# Patient Record
Sex: MALE | Race: WHITE | Employment: OTHER | URBAN - METROPOLITAN AREA
[De-identification: names, ages, dates, MRNs, and addresses within clinical notes are randomized per-mention and may not be internally consistent; named-entity substitution may affect disease eponyms.]

---

## 2017-06-17 ENCOUNTER — HOSPITAL ENCOUNTER (INPATIENT)
Age: 57
LOS: 8 days | Discharge: HOME | DRG: 885 | End: 2017-06-25
Attending: PSYCHIATRY & NEUROLOGY | Admitting: PSYCHIATRY & NEUROLOGY
Payer: MEDICARE

## 2017-06-17 DIAGNOSIS — F32.A DEPRESSION, UNSPECIFIED DEPRESSION TYPE: ICD-10-CM

## 2017-06-17 DIAGNOSIS — F31.9 BIPOLAR 1 DISORDER (HCC): Primary | ICD-10-CM

## 2017-06-17 LAB
ALBUMIN SERPL-MCNC: 4.2 G/DL (ref 3.9–4.9)
ALP BLD-CCNC: 68 U/L (ref 35–104)
ALT SERPL-CCNC: 25 U/L (ref 0–41)
AMPHETAMINE SCREEN, URINE: NORMAL
ANION GAP SERPL CALCULATED.3IONS-SCNC: 12 MEQ/L (ref 7–13)
AST SERPL-CCNC: 26 U/L (ref 0–40)
BARBITURATE SCREEN URINE: NORMAL
BASOPHILS ABSOLUTE: 0 K/UL (ref 0–0.2)
BASOPHILS RELATIVE PERCENT: 0.7 %
BENZODIAZEPINE SCREEN, URINE: NORMAL
BILIRUB SERPL-MCNC: 0.6 MG/DL (ref 0–1.2)
BILIRUBIN URINE: NEGATIVE
BLOOD, URINE: NEGATIVE
BUN BLDV-MCNC: 9 MG/DL (ref 6–20)
CALCIUM SERPL-MCNC: 9.2 MG/DL (ref 8.6–10.2)
CANNABINOID SCREEN URINE: NORMAL
CHLORIDE BLD-SCNC: 101 MEQ/L (ref 98–107)
CLARITY: CLEAR
CO2: 25 MEQ/L (ref 22–29)
COCAINE METABOLITE SCREEN URINE: NORMAL
COLOR: YELLOW
CREAT SERPL-MCNC: 0.86 MG/DL (ref 0.7–1.2)
EOSINOPHILS ABSOLUTE: 0.1 K/UL (ref 0–0.7)
EOSINOPHILS RELATIVE PERCENT: 1 %
ETHANOL PERCENT: NORMAL G/DL
ETHANOL: <10 MG/DL (ref 0–0.08)
GFR AFRICAN AMERICAN: >60
GFR NON-AFRICAN AMERICAN: >60
GLOBULIN: 2.6 G/DL (ref 2.3–3.5)
GLUCOSE BLD-MCNC: 133 MG/DL (ref 74–109)
GLUCOSE URINE: NEGATIVE MG/DL
HCT VFR BLD CALC: 47 % (ref 42–52)
HEMOGLOBIN: 16.2 G/DL (ref 14–18)
KETONES, URINE: NEGATIVE MG/DL
LEUKOCYTE ESTERASE, URINE: NEGATIVE
LYMPHOCYTES ABSOLUTE: 2.4 K/UL (ref 1–4.8)
LYMPHOCYTES RELATIVE PERCENT: 32.4 %
Lab: NORMAL
MCH RBC QN AUTO: 34.7 PG (ref 27–31.3)
MCHC RBC AUTO-ENTMCNC: 34.5 % (ref 33–37)
MCV RBC AUTO: 100.7 FL (ref 80–100)
MONOCYTES ABSOLUTE: 0.5 K/UL (ref 0.2–0.8)
MONOCYTES RELATIVE PERCENT: 6.8 %
NEUTROPHILS ABSOLUTE: 4.3 K/UL (ref 1.4–6.5)
NEUTROPHILS RELATIVE PERCENT: 59.1 %
NITRITE, URINE: NEGATIVE
OPIATE SCREEN URINE: NORMAL
PDW BLD-RTO: 14.7 % (ref 11.5–14.5)
PH UA: 6.5 (ref 5–9)
PHENCYCLIDINE SCREEN URINE: NORMAL
PLATELET # BLD: 151 K/UL (ref 130–400)
POTASSIUM SERPL-SCNC: 3.8 MEQ/L (ref 3.5–5.1)
PROTEIN UA: NEGATIVE MG/DL
RBC # BLD: 4.66 M/UL (ref 4.7–6.1)
SODIUM BLD-SCNC: 138 MEQ/L (ref 132–144)
SPECIFIC GRAVITY UA: 1 (ref 1–1.03)
TOTAL CK: 76 U/L (ref 0–190)
TOTAL PROTEIN: 6.8 G/DL (ref 6.4–8.1)
TSH SERPL DL<=0.05 MIU/L-ACNC: 0.44 UIU/ML (ref 0.27–4.2)
UROBILINOGEN, URINE: 0.2 E.U./DL
WBC # BLD: 7.3 K/UL (ref 4.8–10.8)

## 2017-06-17 PROCEDURE — 81003 URINALYSIS AUTO W/O SCOPE: CPT

## 2017-06-17 PROCEDURE — 82550 ASSAY OF CK (CPK): CPT

## 2017-06-17 PROCEDURE — 6370000000 HC RX 637 (ALT 250 FOR IP): Performed by: PSYCHIATRY & NEUROLOGY

## 2017-06-17 PROCEDURE — 1240000000 HC EMOTIONAL WELLNESS R&B

## 2017-06-17 PROCEDURE — 80053 COMPREHEN METABOLIC PANEL: CPT

## 2017-06-17 PROCEDURE — 80307 DRUG TEST PRSMV CHEM ANLYZR: CPT

## 2017-06-17 PROCEDURE — 84443 ASSAY THYROID STIM HORMONE: CPT

## 2017-06-17 PROCEDURE — 6370000000 HC RX 637 (ALT 250 FOR IP): Performed by: PHYSICIAN ASSISTANT

## 2017-06-17 PROCEDURE — 36415 COLL VENOUS BLD VENIPUNCTURE: CPT

## 2017-06-17 PROCEDURE — 99285 EMERGENCY DEPT VISIT HI MDM: CPT

## 2017-06-17 PROCEDURE — 85025 COMPLETE CBC W/AUTO DIFF WBC: CPT

## 2017-06-17 PROCEDURE — 80074 ACUTE HEPATITIS PANEL: CPT

## 2017-06-17 PROCEDURE — G0480 DRUG TEST DEF 1-7 CLASSES: HCPCS

## 2017-06-17 RX ORDER — ALPRAZOLAM 0.5 MG/1
1 TABLET ORAL EVERY 12 HOURS PRN
Status: DISCONTINUED | OUTPATIENT
Start: 2017-06-17 | End: 2017-06-25 | Stop reason: HOSPADM

## 2017-06-17 RX ORDER — ACETAMINOPHEN 325 MG/1
650 TABLET ORAL EVERY 4 HOURS PRN
Status: DISCONTINUED | OUTPATIENT
Start: 2017-06-17 | End: 2017-06-25 | Stop reason: HOSPADM

## 2017-06-17 RX ORDER — METOPROLOL SUCCINATE 25 MG/1
25 TABLET, EXTENDED RELEASE ORAL DAILY
Status: DISCONTINUED | OUTPATIENT
Start: 2017-06-17 | End: 2017-06-25 | Stop reason: HOSPADM

## 2017-06-17 RX ORDER — LORAZEPAM 1 MG/1
1 TABLET ORAL ONCE
Status: COMPLETED | OUTPATIENT
Start: 2017-06-17 | End: 2017-06-17

## 2017-06-17 RX ORDER — NICOTINE 21 MG/24HR
1 PATCH, TRANSDERMAL 24 HOURS TRANSDERMAL DAILY
Status: DISCONTINUED | OUTPATIENT
Start: 2017-06-17 | End: 2017-06-25 | Stop reason: HOSPADM

## 2017-06-17 RX ORDER — ALPRAZOLAM 1 MG/1
1 TABLET ORAL EVERY 12 HOURS PRN
COMMUNITY

## 2017-06-17 RX ORDER — ASPIRIN 81 MG/1
81 TABLET ORAL DAILY
COMMUNITY
End: 2022-03-21

## 2017-06-17 RX ORDER — CLOPIDOGREL BISULFATE 75 MG/1
75 TABLET ORAL DAILY
Status: DISCONTINUED | OUTPATIENT
Start: 2017-06-17 | End: 2017-06-25 | Stop reason: HOSPADM

## 2017-06-17 RX ORDER — ARIPIPRAZOLE 5 MG/1
2.5 TABLET ORAL 2 TIMES DAILY
COMMUNITY
End: 2022-03-21

## 2017-06-17 RX ORDER — OXCARBAZEPINE 150 MG/1
150 TABLET, FILM COATED ORAL 2 TIMES DAILY
Status: DISCONTINUED | OUTPATIENT
Start: 2017-06-17 | End: 2017-06-17 | Stop reason: SDUPTHER

## 2017-06-17 RX ORDER — ARIPIPRAZOLE 5 MG/1
2.5 TABLET ORAL 2 TIMES DAILY
Status: DISCONTINUED | OUTPATIENT
Start: 2017-06-17 | End: 2017-06-25 | Stop reason: HOSPADM

## 2017-06-17 RX ORDER — OXCARBAZEPINE 150 MG/1
150 TABLET, FILM COATED ORAL 3 TIMES DAILY
Status: DISCONTINUED | OUTPATIENT
Start: 2017-06-17 | End: 2017-06-25 | Stop reason: HOSPADM

## 2017-06-17 RX ORDER — CLOPIDOGREL BISULFATE 75 MG/1
75 TABLET ORAL DAILY
COMMUNITY
End: 2022-06-13 | Stop reason: ALTCHOICE

## 2017-06-17 RX ORDER — HYDROXYZINE HYDROCHLORIDE 50 MG/ML
50 INJECTION, SOLUTION INTRAMUSCULAR EVERY 6 HOURS PRN
Status: DISCONTINUED | OUTPATIENT
Start: 2017-06-17 | End: 2017-06-25 | Stop reason: HOSPADM

## 2017-06-17 RX ORDER — OXCARBAZEPINE 150 MG/1
150 TABLET, FILM COATED ORAL 3 TIMES DAILY
COMMUNITY
End: 2022-03-21

## 2017-06-17 RX ORDER — METOPROLOL SUCCINATE 25 MG/1
25 TABLET, EXTENDED RELEASE ORAL DAILY
COMMUNITY
End: 2018-06-18 | Stop reason: ALTCHOICE

## 2017-06-17 RX ORDER — ASPIRIN 81 MG/1
81 TABLET ORAL DAILY
Status: DISCONTINUED | OUTPATIENT
Start: 2017-06-17 | End: 2017-06-25 | Stop reason: HOSPADM

## 2017-06-17 RX ORDER — HYDROXYZINE PAMOATE 50 MG/1
50 CAPSULE ORAL EVERY 6 HOURS PRN
Status: DISCONTINUED | OUTPATIENT
Start: 2017-06-17 | End: 2017-06-25 | Stop reason: HOSPADM

## 2017-06-17 RX ADMIN — ALPRAZOLAM 1 MG: 0.5 TABLET ORAL at 17:55

## 2017-06-17 RX ADMIN — ARIPIPRAZOLE 2.5 MG: 5 TABLET ORAL at 21:02

## 2017-06-17 RX ADMIN — OXCARBAZEPINE 150 MG: 150 TABLET ORAL at 14:30

## 2017-06-17 RX ADMIN — LORAZEPAM 1 MG: 1 TABLET ORAL at 14:30

## 2017-06-17 RX ADMIN — OXCARBAZEPINE 150 MG: 150 TABLET, FILM COATED ORAL at 21:02

## 2017-06-17 ASSESSMENT — ENCOUNTER SYMPTOMS
COUGH: 0
SORE THROAT: 0
STRIDOR: 0
WHEEZING: 0
NAUSEA: 0
DIARRHEA: 0
EYE DISCHARGE: 0
CHOKING: 0
RHINORRHEA: 0
BACK PAIN: 0
ABDOMINAL DISTENTION: 0
SHORTNESS OF BREATH: 0
COLOR CHANGE: 0
CONSTIPATION: 0
ABDOMINAL PAIN: 0
VOMITING: 0

## 2017-06-17 ASSESSMENT — SLEEP AND FATIGUE QUESTIONNAIRES
AVERAGE NUMBER OF SLEEP HOURS: 10
DO YOU USE A SLEEP AID: NO
DO YOU HAVE DIFFICULTY SLEEPING: NO

## 2017-06-17 ASSESSMENT — PAIN DESCRIPTION - FREQUENCY: FREQUENCY: INTERMITTENT

## 2017-06-17 ASSESSMENT — PATIENT HEALTH QUESTIONNAIRE - PHQ9: SUM OF ALL RESPONSES TO PHQ QUESTIONS 1-9: 26

## 2017-06-17 ASSESSMENT — PAIN SCALES - GENERAL: PAINLEVEL_OUTOF10: 7

## 2017-06-17 ASSESSMENT — PAIN DESCRIPTION - DESCRIPTORS: DESCRIPTORS: DISCOMFORT

## 2017-06-18 ENCOUNTER — APPOINTMENT (OUTPATIENT)
Dept: GENERAL RADIOLOGY | Age: 57
DRG: 885 | End: 2017-06-18
Payer: MEDICARE

## 2017-06-18 PROBLEM — I73.9 PVD (PERIPHERAL VASCULAR DISEASE) (HCC): Status: ACTIVE | Noted: 2017-06-18

## 2017-06-18 PROBLEM — I25.2 HISTORY OF MI (MYOCARDIAL INFARCTION): Status: ACTIVE | Noted: 2017-06-18

## 2017-06-18 PROBLEM — Z86.73 HISTORY OF STROKE: Status: ACTIVE | Noted: 2017-06-18

## 2017-06-18 PROBLEM — C43.9 MELANOMA (HCC): Status: ACTIVE | Noted: 2017-06-18

## 2017-06-18 PROBLEM — C34.90 LUNG CANCER (HCC): Status: ACTIVE | Noted: 2017-06-18

## 2017-06-18 PROBLEM — J44.9 COPD (CHRONIC OBSTRUCTIVE PULMONARY DISEASE) (HCC): Status: ACTIVE | Noted: 2017-06-18

## 2017-06-18 PROBLEM — B19.20 HCV (HEPATITIS C VIRUS): Status: ACTIVE | Noted: 2017-06-18

## 2017-06-18 PROBLEM — I25.10 CAD (CORONARY ARTERY DISEASE): Status: ACTIVE | Noted: 2017-06-18

## 2017-06-18 LAB
HAV IGM SER IA-ACNC: ABNORMAL
HEPATITIS B CORE IGM ANTIBODY: ABNORMAL
HEPATITIS B SURFACE ANTIGEN INTERPRETATION: ABNORMAL
HEPATITIS C ANTIBODY INTERPRETATION: REACTIVE
HEPATITIS INTERPRETATION:: ABNORMAL

## 2017-06-18 PROCEDURE — 71010 XR CHEST PORTABLE: CPT

## 2017-06-18 PROCEDURE — 6370000000 HC RX 637 (ALT 250 FOR IP): Performed by: PHYSICIAN ASSISTANT

## 2017-06-18 PROCEDURE — 94664 DEMO&/EVAL PT USE INHALER: CPT

## 2017-06-18 PROCEDURE — 6370000000 HC RX 637 (ALT 250 FOR IP): Performed by: PSYCHIATRY & NEUROLOGY

## 2017-06-18 PROCEDURE — 94640 AIRWAY INHALATION TREATMENT: CPT

## 2017-06-18 PROCEDURE — 1240000000 HC EMOTIONAL WELLNESS R&B

## 2017-06-18 PROCEDURE — 2580000003 HC RX 258: Performed by: PHYSICIAN ASSISTANT

## 2017-06-18 RX ORDER — PREDNISONE 10 MG/1
10 TABLET ORAL 2 TIMES DAILY
Status: COMPLETED | OUTPATIENT
Start: 2017-06-22 | End: 2017-06-23

## 2017-06-18 RX ORDER — 0.9 % SODIUM CHLORIDE 0.9 %
1000 INTRAVENOUS SOLUTION INTRAVENOUS ONCE
Status: COMPLETED | OUTPATIENT
Start: 2017-06-18 | End: 2017-06-18

## 2017-06-18 RX ORDER — PREDNISONE 20 MG/1
40 TABLET ORAL 2 TIMES DAILY
Status: COMPLETED | OUTPATIENT
Start: 2017-06-18 | End: 2017-06-19

## 2017-06-18 RX ORDER — AZITHROMYCIN 500 MG/1
500 TABLET, FILM COATED ORAL DAILY
Status: COMPLETED | OUTPATIENT
Start: 2017-06-18 | End: 2017-06-22

## 2017-06-18 RX ORDER — PREDNISONE 20 MG/1
20 TABLET ORAL 2 TIMES DAILY
Status: COMPLETED | OUTPATIENT
Start: 2017-06-20 | End: 2017-06-21

## 2017-06-18 RX ORDER — TRAMADOL HYDROCHLORIDE 50 MG/1
50 TABLET ORAL EVERY 6 HOURS PRN
Status: DISCONTINUED | OUTPATIENT
Start: 2017-06-18 | End: 2017-06-19

## 2017-06-18 RX ORDER — IPRATROPIUM BROMIDE AND ALBUTEROL SULFATE 2.5; .5 MG/3ML; MG/3ML
1 SOLUTION RESPIRATORY (INHALATION)
Status: DISCONTINUED | OUTPATIENT
Start: 2017-06-18 | End: 2017-06-18

## 2017-06-18 RX ORDER — SERTRALINE HYDROCHLORIDE 25 MG/1
25 TABLET, FILM COATED ORAL DAILY
Status: DISCONTINUED | OUTPATIENT
Start: 2017-06-18 | End: 2017-06-20

## 2017-06-18 RX ORDER — PREDNISONE 10 MG/1
10 TABLET ORAL DAILY
Status: DISCONTINUED | OUTPATIENT
Start: 2017-06-24 | End: 2017-06-25 | Stop reason: HOSPADM

## 2017-06-18 RX ORDER — ALBUTEROL SULFATE 90 UG/1
2 AEROSOL, METERED RESPIRATORY (INHALATION)
Status: DISCONTINUED | OUTPATIENT
Start: 2017-06-18 | End: 2017-06-25 | Stop reason: HOSPADM

## 2017-06-18 RX ADMIN — CLOPIDOGREL BISULFATE 75 MG: 75 TABLET ORAL at 09:10

## 2017-06-18 RX ADMIN — ALPRAZOLAM 1 MG: 0.5 TABLET ORAL at 09:52

## 2017-06-18 RX ADMIN — ARIPIPRAZOLE 2.5 MG: 5 TABLET ORAL at 20:12

## 2017-06-18 RX ADMIN — ASPIRIN 81 MG: 81 TABLET, COATED ORAL at 09:10

## 2017-06-18 RX ADMIN — AZITHROMYCIN 500 MG: 500 TABLET, FILM COATED ORAL at 16:58

## 2017-06-18 RX ADMIN — IPRATROPIUM BROMIDE AND ALBUTEROL 1 PUFF: 20; 100 SPRAY, METERED RESPIRATORY (INHALATION) at 19:49

## 2017-06-18 RX ADMIN — HYDROXYZINE PAMOATE 50 MG: 50 CAPSULE ORAL at 17:03

## 2017-06-18 RX ADMIN — Medication 2 PUFF: at 19:51

## 2017-06-18 RX ADMIN — SERTRALINE HYDROCHLORIDE 25 MG: 25 TABLET ORAL at 09:09

## 2017-06-18 RX ADMIN — OXCARBAZEPINE 150 MG: 150 TABLET, FILM COATED ORAL at 13:45

## 2017-06-18 RX ADMIN — ACETAMINOPHEN 650 MG: 325 TABLET ORAL at 13:56

## 2017-06-18 RX ADMIN — OXCARBAZEPINE 150 MG: 150 TABLET, FILM COATED ORAL at 20:12

## 2017-06-18 RX ADMIN — PREDNISONE 40 MG: 20 TABLET ORAL at 16:58

## 2017-06-18 RX ADMIN — METOPROLOL SUCCINATE 25 MG: 25 TABLET, FILM COATED, EXTENDED RELEASE ORAL at 09:09

## 2017-06-18 RX ADMIN — OXCARBAZEPINE 150 MG: 150 TABLET, FILM COATED ORAL at 09:10

## 2017-06-18 RX ADMIN — ARIPIPRAZOLE 2.5 MG: 5 TABLET ORAL at 09:10

## 2017-06-18 RX ADMIN — TRAMADOL HYDROCHLORIDE 50 MG: 50 TABLET, FILM COATED ORAL at 16:58

## 2017-06-18 RX ADMIN — PREDNISONE 40 MG: 20 TABLET ORAL at 20:12

## 2017-06-18 RX ADMIN — SODIUM CHLORIDE 1000 ML: 9 INJECTION, SOLUTION INTRAVENOUS at 21:38

## 2017-06-18 ASSESSMENT — PAIN SCALES - GENERAL
PAINLEVEL_OUTOF10: 4
PAINLEVEL_OUTOF10: 8
PAINLEVEL_OUTOF10: 6

## 2017-06-18 ASSESSMENT — ENCOUNTER SYMPTOMS: BACK PAIN: 1

## 2017-06-18 ASSESSMENT — LIFESTYLE VARIABLES: HISTORY_ALCOHOL_USE: YES

## 2017-06-19 ENCOUNTER — APPOINTMENT (OUTPATIENT)
Dept: GENERAL RADIOLOGY | Age: 57
DRG: 885 | End: 2017-06-19
Payer: MEDICARE

## 2017-06-19 PROBLEM — G89.4 CHRONIC PAIN DISORDER: Status: ACTIVE | Noted: 2017-06-19

## 2017-06-19 PROBLEM — F33.2 SEVERE EPISODE OF RECURRENT MAJOR DEPRESSIVE DISORDER, WITHOUT PSYCHOTIC FEATURES (HCC): Status: ACTIVE | Noted: 2017-06-19

## 2017-06-19 PROBLEM — M54.9 BACK PAIN: Chronic | Status: ACTIVE | Noted: 2017-06-19

## 2017-06-19 PROCEDURE — 72110 X-RAY EXAM L-2 SPINE 4/>VWS: CPT

## 2017-06-19 PROCEDURE — 6370000000 HC RX 637 (ALT 250 FOR IP): Performed by: PHYSICIAN ASSISTANT

## 2017-06-19 PROCEDURE — 94640 AIRWAY INHALATION TREATMENT: CPT

## 2017-06-19 PROCEDURE — 6370000000 HC RX 637 (ALT 250 FOR IP): Performed by: ANESTHESIOLOGY

## 2017-06-19 PROCEDURE — 6370000000 HC RX 637 (ALT 250 FOR IP): Performed by: PSYCHIATRY & NEUROLOGY

## 2017-06-19 PROCEDURE — 73522 X-RAY EXAM HIPS BI 3-4 VIEWS: CPT

## 2017-06-19 PROCEDURE — 99232 SBSQ HOSP IP/OBS MODERATE 35: CPT | Performed by: PSYCHIATRY & NEUROLOGY

## 2017-06-19 PROCEDURE — 1240000000 HC EMOTIONAL WELLNESS R&B

## 2017-06-19 PROCEDURE — 2580000003 HC RX 258: Performed by: INTERNAL MEDICINE

## 2017-06-19 RX ORDER — MELOXICAM 7.5 MG/1
7.5 TABLET ORAL DAILY
Status: DISCONTINUED | OUTPATIENT
Start: 2017-06-19 | End: 2017-06-24

## 2017-06-19 RX ORDER — TIZANIDINE 4 MG/1
4 TABLET ORAL 3 TIMES DAILY
Status: DISCONTINUED | OUTPATIENT
Start: 2017-06-19 | End: 2017-06-25 | Stop reason: HOSPADM

## 2017-06-19 RX ORDER — 0.9 % SODIUM CHLORIDE 0.9 %
500 INTRAVENOUS SOLUTION INTRAVENOUS ONCE
Status: COMPLETED | OUTPATIENT
Start: 2017-06-19 | End: 2017-06-19

## 2017-06-19 RX ORDER — TRAMADOL HYDROCHLORIDE 50 MG/1
50 TABLET ORAL EVERY 6 HOURS PRN
Status: DISCONTINUED | OUTPATIENT
Start: 2017-06-19 | End: 2017-06-25 | Stop reason: HOSPADM

## 2017-06-19 RX ORDER — TRAMADOL HYDROCHLORIDE 50 MG/1
50 TABLET ORAL
Status: DISCONTINUED | OUTPATIENT
Start: 2017-06-19 | End: 2017-06-19

## 2017-06-19 RX ORDER — LIDOCAINE 50 MG/G
3 PATCH TOPICAL DAILY
Status: DISCONTINUED | OUTPATIENT
Start: 2017-06-19 | End: 2017-06-25 | Stop reason: HOSPADM

## 2017-06-19 RX ADMIN — Medication 2 PUFF: at 07:53

## 2017-06-19 RX ADMIN — MELOXICAM 7.5 MG: 7.5 TABLET ORAL at 13:32

## 2017-06-19 RX ADMIN — TIZANIDINE 4 MG: 4 TABLET ORAL at 13:32

## 2017-06-19 RX ADMIN — OXCARBAZEPINE 150 MG: 150 TABLET, FILM COATED ORAL at 20:44

## 2017-06-19 RX ADMIN — SODIUM CHLORIDE 500 ML: 9 INJECTION, SOLUTION INTRAVENOUS at 14:54

## 2017-06-19 RX ADMIN — SERTRALINE HYDROCHLORIDE 25 MG: 25 TABLET ORAL at 09:54

## 2017-06-19 RX ADMIN — ASPIRIN 81 MG: 81 TABLET, COATED ORAL at 09:56

## 2017-06-19 RX ADMIN — PREDNISONE 40 MG: 20 TABLET ORAL at 09:55

## 2017-06-19 RX ADMIN — HYDROXYZINE PAMOATE 50 MG: 50 CAPSULE ORAL at 20:44

## 2017-06-19 RX ADMIN — Medication 2 PUFF: at 19:22

## 2017-06-19 RX ADMIN — AZITHROMYCIN 500 MG: 500 TABLET, FILM COATED ORAL at 09:55

## 2017-06-19 RX ADMIN — TRAMADOL HYDROCHLORIDE 50 MG: 50 TABLET, FILM COATED ORAL at 13:41

## 2017-06-19 RX ADMIN — IPRATROPIUM BROMIDE AND ALBUTEROL 1 PUFF: 20; 100 SPRAY, METERED RESPIRATORY (INHALATION) at 07:53

## 2017-06-19 RX ADMIN — OXCARBAZEPINE 150 MG: 150 TABLET, FILM COATED ORAL at 09:55

## 2017-06-19 RX ADMIN — OXCARBAZEPINE 150 MG: 150 TABLET, FILM COATED ORAL at 13:31

## 2017-06-19 RX ADMIN — TIZANIDINE 4 MG: 4 TABLET ORAL at 20:44

## 2017-06-19 RX ADMIN — IPRATROPIUM BROMIDE AND ALBUTEROL 1 PUFF: 20; 100 SPRAY, METERED RESPIRATORY (INHALATION) at 15:50

## 2017-06-19 RX ADMIN — ARIPIPRAZOLE 2.5 MG: 5 TABLET ORAL at 09:59

## 2017-06-19 RX ADMIN — ARIPIPRAZOLE 2.5 MG: 5 TABLET ORAL at 20:44

## 2017-06-19 RX ADMIN — IPRATROPIUM BROMIDE AND ALBUTEROL 1 PUFF: 20; 100 SPRAY, METERED RESPIRATORY (INHALATION) at 19:22

## 2017-06-19 RX ADMIN — ALPRAZOLAM 1 MG: 0.5 TABLET ORAL at 09:56

## 2017-06-19 RX ADMIN — CLOPIDOGREL BISULFATE 75 MG: 75 TABLET ORAL at 09:57

## 2017-06-19 RX ADMIN — PREDNISONE 40 MG: 20 TABLET ORAL at 20:44

## 2017-06-19 RX ADMIN — TRAMADOL HYDROCHLORIDE 50 MG: 50 TABLET, FILM COATED ORAL at 20:44

## 2017-06-19 RX ADMIN — TRAMADOL HYDROCHLORIDE 50 MG: 50 TABLET, FILM COATED ORAL at 09:55

## 2017-06-19 ASSESSMENT — PAIN SCALES - GENERAL
PAINLEVEL_OUTOF10: 8
PAINLEVEL_OUTOF10: 6
PAINLEVEL_OUTOF10: 7
PAINLEVEL_OUTOF10: 9

## 2017-06-20 ENCOUNTER — APPOINTMENT (OUTPATIENT)
Dept: CT IMAGING | Age: 57
DRG: 885 | End: 2017-06-20
Payer: MEDICARE

## 2017-06-20 PROCEDURE — 6370000000 HC RX 637 (ALT 250 FOR IP): Performed by: ANESTHESIOLOGY

## 2017-06-20 PROCEDURE — 6370000000 HC RX 637 (ALT 250 FOR IP): Performed by: PHYSICIAN ASSISTANT

## 2017-06-20 PROCEDURE — 1240000000 HC EMOTIONAL WELLNESS R&B

## 2017-06-20 PROCEDURE — 6370000000 HC RX 637 (ALT 250 FOR IP): Performed by: PSYCHIATRY & NEUROLOGY

## 2017-06-20 PROCEDURE — 94640 AIRWAY INHALATION TREATMENT: CPT

## 2017-06-20 PROCEDURE — 99232 SBSQ HOSP IP/OBS MODERATE 35: CPT | Performed by: PSYCHIATRY & NEUROLOGY

## 2017-06-20 PROCEDURE — 2500000003 HC RX 250 WO HCPCS: Performed by: RADIOLOGY

## 2017-06-20 PROCEDURE — 6360000004 HC RX CONTRAST MEDICATION: Performed by: RADIOLOGY

## 2017-06-20 PROCEDURE — 74160 CT ABDOMEN W/CONTRAST: CPT

## 2017-06-20 PROCEDURE — 94664 DEMO&/EVAL PT USE INHALER: CPT

## 2017-06-20 PROCEDURE — 99223 1ST HOSP IP/OBS HIGH 75: CPT | Performed by: INTERNAL MEDICINE

## 2017-06-20 RX ORDER — PANTOPRAZOLE SODIUM 40 MG/1
40 TABLET, DELAYED RELEASE ORAL
Status: DISCONTINUED | OUTPATIENT
Start: 2017-06-21 | End: 2017-06-25 | Stop reason: HOSPADM

## 2017-06-20 RX ADMIN — TIZANIDINE 4 MG: 4 TABLET ORAL at 21:10

## 2017-06-20 RX ADMIN — METOPROLOL SUCCINATE 25 MG: 25 TABLET, FILM COATED, EXTENDED RELEASE ORAL at 08:11

## 2017-06-20 RX ADMIN — TRAMADOL HYDROCHLORIDE 50 MG: 50 TABLET, FILM COATED ORAL at 21:11

## 2017-06-20 RX ADMIN — TIZANIDINE 4 MG: 4 TABLET ORAL at 14:02

## 2017-06-20 RX ADMIN — IOPAMIDOL 100 ML: 755 INJECTION, SOLUTION INTRAVENOUS at 15:50

## 2017-06-20 RX ADMIN — PREDNISONE 20 MG: 20 TABLET ORAL at 21:10

## 2017-06-20 RX ADMIN — AZITHROMYCIN 500 MG: 500 TABLET, FILM COATED ORAL at 08:13

## 2017-06-20 RX ADMIN — IPRATROPIUM BROMIDE AND ALBUTEROL 1 PUFF: 20; 100 SPRAY, METERED RESPIRATORY (INHALATION) at 06:48

## 2017-06-20 RX ADMIN — MELOXICAM 7.5 MG: 7.5 TABLET ORAL at 08:11

## 2017-06-20 RX ADMIN — ASPIRIN 81 MG: 81 TABLET, COATED ORAL at 08:10

## 2017-06-20 RX ADMIN — ALPRAZOLAM 1 MG: 0.5 TABLET ORAL at 08:12

## 2017-06-20 RX ADMIN — OXCARBAZEPINE 150 MG: 150 TABLET, FILM COATED ORAL at 14:02

## 2017-06-20 RX ADMIN — IPRATROPIUM BROMIDE AND ALBUTEROL 1 PUFF: 20; 100 SPRAY, METERED RESPIRATORY (INHALATION) at 13:02

## 2017-06-20 RX ADMIN — Medication 2 PUFF: at 06:48

## 2017-06-20 RX ADMIN — ALPRAZOLAM 1 MG: 0.5 TABLET ORAL at 21:10

## 2017-06-20 RX ADMIN — Medication 2 PUFF: at 19:54

## 2017-06-20 RX ADMIN — SERTRALINE HYDROCHLORIDE 25 MG: 25 TABLET ORAL at 08:12

## 2017-06-20 RX ADMIN — TRAMADOL HYDROCHLORIDE 50 MG: 50 TABLET, FILM COATED ORAL at 13:27

## 2017-06-20 RX ADMIN — OXCARBAZEPINE 150 MG: 150 TABLET, FILM COATED ORAL at 08:11

## 2017-06-20 RX ADMIN — PREDNISONE 20 MG: 20 TABLET ORAL at 08:12

## 2017-06-20 RX ADMIN — TRAMADOL HYDROCHLORIDE 50 MG: 50 TABLET, FILM COATED ORAL at 07:33

## 2017-06-20 RX ADMIN — ARIPIPRAZOLE 2.5 MG: 5 TABLET ORAL at 08:12

## 2017-06-20 RX ADMIN — BARIUM SULFATE 450 ML: 21 SUSPENSION ORAL at 15:51

## 2017-06-20 RX ADMIN — TIZANIDINE 4 MG: 4 TABLET ORAL at 08:13

## 2017-06-20 RX ADMIN — CLOPIDOGREL BISULFATE 75 MG: 75 TABLET ORAL at 08:11

## 2017-06-20 RX ADMIN — HYDROXYZINE PAMOATE 50 MG: 50 CAPSULE ORAL at 13:06

## 2017-06-20 ASSESSMENT — PAIN DESCRIPTION - FREQUENCY: FREQUENCY: CONTINUOUS

## 2017-06-20 ASSESSMENT — PAIN SCALES - GENERAL
PAINLEVEL_OUTOF10: 5
PAINLEVEL_OUTOF10: 10
PAINLEVEL_OUTOF10: 8
PAINLEVEL_OUTOF10: 10

## 2017-06-20 ASSESSMENT — PAIN DESCRIPTION - LOCATION: LOCATION: FLANK

## 2017-06-20 ASSESSMENT — PAIN DESCRIPTION - DESCRIPTORS: DESCRIPTORS: ACHING

## 2017-06-21 PROCEDURE — 86480 TB TEST CELL IMMUN MEASURE: CPT

## 2017-06-21 PROCEDURE — 6370000000 HC RX 637 (ALT 250 FOR IP): Performed by: ANESTHESIOLOGY

## 2017-06-21 PROCEDURE — 6370000000 HC RX 637 (ALT 250 FOR IP): Performed by: PSYCHIATRY & NEUROLOGY

## 2017-06-21 PROCEDURE — 6370000000 HC RX 637 (ALT 250 FOR IP): Performed by: PHYSICIAN ASSISTANT

## 2017-06-21 PROCEDURE — 99232 SBSQ HOSP IP/OBS MODERATE 35: CPT | Performed by: PSYCHIATRY & NEUROLOGY

## 2017-06-21 PROCEDURE — 6370000000 HC RX 637 (ALT 250 FOR IP): Performed by: INTERNAL MEDICINE

## 2017-06-21 PROCEDURE — 94640 AIRWAY INHALATION TREATMENT: CPT

## 2017-06-21 PROCEDURE — 94760 N-INVAS EAR/PLS OXIMETRY 1: CPT

## 2017-06-21 PROCEDURE — 1240000000 HC EMOTIONAL WELLNESS R&B

## 2017-06-21 RX ADMIN — CLOPIDOGREL BISULFATE 75 MG: 75 TABLET ORAL at 08:55

## 2017-06-21 RX ADMIN — ARIPIPRAZOLE 2.5 MG: 5 TABLET ORAL at 20:15

## 2017-06-21 RX ADMIN — PREDNISONE 20 MG: 20 TABLET ORAL at 08:54

## 2017-06-21 RX ADMIN — AZITHROMYCIN 500 MG: 500 TABLET, FILM COATED ORAL at 08:55

## 2017-06-21 RX ADMIN — ASPIRIN 81 MG: 81 TABLET, COATED ORAL at 08:56

## 2017-06-21 RX ADMIN — OXCARBAZEPINE 150 MG: 150 TABLET, FILM COATED ORAL at 14:01

## 2017-06-21 RX ADMIN — PREDNISONE 20 MG: 20 TABLET ORAL at 20:16

## 2017-06-21 RX ADMIN — MELOXICAM 7.5 MG: 7.5 TABLET ORAL at 08:56

## 2017-06-21 RX ADMIN — PANTOPRAZOLE SODIUM 40 MG: 40 TABLET, DELAYED RELEASE ORAL at 06:42

## 2017-06-21 RX ADMIN — ARIPIPRAZOLE 2.5 MG: 5 TABLET ORAL at 08:54

## 2017-06-21 RX ADMIN — TIZANIDINE 4 MG: 4 TABLET ORAL at 20:15

## 2017-06-21 RX ADMIN — IPRATROPIUM BROMIDE AND ALBUTEROL 1 PUFF: 20; 100 SPRAY, METERED RESPIRATORY (INHALATION) at 07:18

## 2017-06-21 RX ADMIN — SERTRALINE HYDROCHLORIDE 50 MG: 50 TABLET ORAL at 08:55

## 2017-06-21 RX ADMIN — ALPRAZOLAM 1 MG: 0.5 TABLET ORAL at 21:03

## 2017-06-21 RX ADMIN — IPRATROPIUM BROMIDE AND ALBUTEROL 1 PUFF: 20; 100 SPRAY, METERED RESPIRATORY (INHALATION) at 23:10

## 2017-06-21 RX ADMIN — TRAMADOL HYDROCHLORIDE 50 MG: 50 TABLET, FILM COATED ORAL at 20:15

## 2017-06-21 RX ADMIN — TRAMADOL HYDROCHLORIDE 50 MG: 50 TABLET, FILM COATED ORAL at 13:03

## 2017-06-21 RX ADMIN — TIZANIDINE 4 MG: 4 TABLET ORAL at 14:01

## 2017-06-21 RX ADMIN — METOPROLOL SUCCINATE 25 MG: 25 TABLET, FILM COATED, EXTENDED RELEASE ORAL at 08:55

## 2017-06-21 RX ADMIN — Medication 2 PUFF: at 07:18

## 2017-06-21 RX ADMIN — Medication 2 PUFF: at 23:11

## 2017-06-21 RX ADMIN — TIZANIDINE 4 MG: 4 TABLET ORAL at 08:55

## 2017-06-21 RX ADMIN — OXCARBAZEPINE 150 MG: 150 TABLET, FILM COATED ORAL at 08:54

## 2017-06-21 RX ADMIN — ALPRAZOLAM 1 MG: 0.5 TABLET ORAL at 08:55

## 2017-06-21 RX ADMIN — TRAMADOL HYDROCHLORIDE 50 MG: 50 TABLET, FILM COATED ORAL at 06:42

## 2017-06-21 RX ADMIN — OXCARBAZEPINE 150 MG: 150 TABLET, FILM COATED ORAL at 20:15

## 2017-06-21 ASSESSMENT — PAIN SCALES - GENERAL
PAINLEVEL_OUTOF10: 10

## 2017-06-22 PROCEDURE — 6370000000 HC RX 637 (ALT 250 FOR IP): Performed by: PSYCHIATRY & NEUROLOGY

## 2017-06-22 PROCEDURE — 99232 SBSQ HOSP IP/OBS MODERATE 35: CPT | Performed by: PSYCHIATRY & NEUROLOGY

## 2017-06-22 PROCEDURE — 6370000000 HC RX 637 (ALT 250 FOR IP): Performed by: PHYSICIAN ASSISTANT

## 2017-06-22 PROCEDURE — 6370000000 HC RX 637 (ALT 250 FOR IP): Performed by: INTERNAL MEDICINE

## 2017-06-22 PROCEDURE — 6370000000 HC RX 637 (ALT 250 FOR IP): Performed by: ANESTHESIOLOGY

## 2017-06-22 PROCEDURE — 94640 AIRWAY INHALATION TREATMENT: CPT

## 2017-06-22 PROCEDURE — 94760 N-INVAS EAR/PLS OXIMETRY 1: CPT

## 2017-06-22 PROCEDURE — 1240000000 HC EMOTIONAL WELLNESS R&B

## 2017-06-22 RX ADMIN — TIZANIDINE 4 MG: 4 TABLET ORAL at 14:58

## 2017-06-22 RX ADMIN — TRAMADOL HYDROCHLORIDE 50 MG: 50 TABLET, FILM COATED ORAL at 09:59

## 2017-06-22 RX ADMIN — TRAMADOL HYDROCHLORIDE 50 MG: 50 TABLET, FILM COATED ORAL at 20:22

## 2017-06-22 RX ADMIN — ALPRAZOLAM 1 MG: 0.5 TABLET ORAL at 09:58

## 2017-06-22 RX ADMIN — IPRATROPIUM BROMIDE AND ALBUTEROL 1 PUFF: 20; 100 SPRAY, METERED RESPIRATORY (INHALATION) at 07:14

## 2017-06-22 RX ADMIN — ARIPIPRAZOLE 2.5 MG: 5 TABLET ORAL at 20:21

## 2017-06-22 RX ADMIN — CLOPIDOGREL BISULFATE 75 MG: 75 TABLET ORAL at 09:59

## 2017-06-22 RX ADMIN — PANTOPRAZOLE SODIUM 40 MG: 40 TABLET, DELAYED RELEASE ORAL at 07:05

## 2017-06-22 RX ADMIN — ARIPIPRAZOLE 2.5 MG: 5 TABLET ORAL at 10:00

## 2017-06-22 RX ADMIN — OXCARBAZEPINE 150 MG: 150 TABLET, FILM COATED ORAL at 10:01

## 2017-06-22 RX ADMIN — PREDNISONE 10 MG: 10 TABLET ORAL at 20:22

## 2017-06-22 RX ADMIN — PREDNISONE 10 MG: 10 TABLET ORAL at 10:05

## 2017-06-22 RX ADMIN — SERTRALINE HYDROCHLORIDE 50 MG: 50 TABLET ORAL at 09:59

## 2017-06-22 RX ADMIN — ASPIRIN 81 MG: 81 TABLET, COATED ORAL at 09:59

## 2017-06-22 RX ADMIN — ALPRAZOLAM 1 MG: 0.5 TABLET ORAL at 23:10

## 2017-06-22 RX ADMIN — AZITHROMYCIN 500 MG: 500 TABLET, FILM COATED ORAL at 10:02

## 2017-06-22 RX ADMIN — TIZANIDINE 4 MG: 4 TABLET ORAL at 09:59

## 2017-06-22 RX ADMIN — Medication 2 PUFF: at 07:14

## 2017-06-22 RX ADMIN — Medication 2 PUFF: at 20:12

## 2017-06-22 RX ADMIN — MELOXICAM 7.5 MG: 7.5 TABLET ORAL at 09:59

## 2017-06-22 RX ADMIN — OXCARBAZEPINE 150 MG: 150 TABLET, FILM COATED ORAL at 20:21

## 2017-06-22 RX ADMIN — HYDROXYZINE PAMOATE 50 MG: 50 CAPSULE ORAL at 20:22

## 2017-06-22 RX ADMIN — OXCARBAZEPINE 150 MG: 150 TABLET, FILM COATED ORAL at 14:57

## 2017-06-22 RX ADMIN — TIZANIDINE 4 MG: 4 TABLET ORAL at 20:22

## 2017-06-22 ASSESSMENT — PAIN SCALES - GENERAL
PAINLEVEL_OUTOF10: 6
PAINLEVEL_OUTOF10: 8

## 2017-06-23 PROCEDURE — 99231 SBSQ HOSP IP/OBS SF/LOW 25: CPT | Performed by: PSYCHIATRY & NEUROLOGY

## 2017-06-23 PROCEDURE — 6370000000 HC RX 637 (ALT 250 FOR IP): Performed by: PHYSICIAN ASSISTANT

## 2017-06-23 PROCEDURE — 6370000000 HC RX 637 (ALT 250 FOR IP): Performed by: PSYCHIATRY & NEUROLOGY

## 2017-06-23 PROCEDURE — 6370000000 HC RX 637 (ALT 250 FOR IP): Performed by: INTERNAL MEDICINE

## 2017-06-23 PROCEDURE — 1240000000 HC EMOTIONAL WELLNESS R&B

## 2017-06-23 PROCEDURE — 94640 AIRWAY INHALATION TREATMENT: CPT

## 2017-06-23 PROCEDURE — 6370000000 HC RX 637 (ALT 250 FOR IP): Performed by: ANESTHESIOLOGY

## 2017-06-23 PROCEDURE — 90833 PSYTX W PT W E/M 30 MIN: CPT | Performed by: PSYCHIATRY & NEUROLOGY

## 2017-06-23 RX ADMIN — PREDNISONE 10 MG: 10 TABLET ORAL at 20:19

## 2017-06-23 RX ADMIN — ASPIRIN 81 MG: 81 TABLET, COATED ORAL at 08:45

## 2017-06-23 RX ADMIN — TIZANIDINE 4 MG: 4 TABLET ORAL at 08:44

## 2017-06-23 RX ADMIN — HYDROXYZINE PAMOATE 50 MG: 50 CAPSULE ORAL at 20:18

## 2017-06-23 RX ADMIN — SERTRALINE HYDROCHLORIDE 50 MG: 50 TABLET ORAL at 08:44

## 2017-06-23 RX ADMIN — ARIPIPRAZOLE 2.5 MG: 5 TABLET ORAL at 20:19

## 2017-06-23 RX ADMIN — OXCARBAZEPINE 150 MG: 150 TABLET, FILM COATED ORAL at 20:19

## 2017-06-23 RX ADMIN — OXCARBAZEPINE 150 MG: 150 TABLET, FILM COATED ORAL at 08:44

## 2017-06-23 RX ADMIN — MELOXICAM 7.5 MG: 7.5 TABLET ORAL at 08:44

## 2017-06-23 RX ADMIN — ALPRAZOLAM 1 MG: 0.5 TABLET ORAL at 11:17

## 2017-06-23 RX ADMIN — PREDNISONE 10 MG: 10 TABLET ORAL at 08:47

## 2017-06-23 RX ADMIN — TRAMADOL HYDROCHLORIDE 50 MG: 50 TABLET, FILM COATED ORAL at 09:23

## 2017-06-23 RX ADMIN — OXCARBAZEPINE 150 MG: 150 TABLET, FILM COATED ORAL at 14:11

## 2017-06-23 RX ADMIN — TIZANIDINE 4 MG: 4 TABLET ORAL at 20:19

## 2017-06-23 RX ADMIN — TRAMADOL HYDROCHLORIDE 50 MG: 50 TABLET, FILM COATED ORAL at 20:19

## 2017-06-23 RX ADMIN — IPRATROPIUM BROMIDE AND ALBUTEROL 1 PUFF: 20; 100 SPRAY, METERED RESPIRATORY (INHALATION) at 07:46

## 2017-06-23 RX ADMIN — TIZANIDINE 4 MG: 4 TABLET ORAL at 14:11

## 2017-06-23 RX ADMIN — CLOPIDOGREL BISULFATE 75 MG: 75 TABLET ORAL at 08:44

## 2017-06-23 RX ADMIN — Medication 2 PUFF: at 07:45

## 2017-06-23 RX ADMIN — ARIPIPRAZOLE 2.5 MG: 5 TABLET ORAL at 08:45

## 2017-06-23 RX ADMIN — PANTOPRAZOLE SODIUM 40 MG: 40 TABLET, DELAYED RELEASE ORAL at 06:19

## 2017-06-23 ASSESSMENT — PAIN SCALES - GENERAL
PAINLEVEL_OUTOF10: 5
PAINLEVEL_OUTOF10: 8

## 2017-06-24 PROBLEM — M13.859: Chronic | Status: ACTIVE | Noted: 2017-06-24

## 2017-06-24 PROBLEM — G89.4 CHRONIC PAIN SYNDROME: Status: ACTIVE | Noted: 2017-06-24

## 2017-06-24 PROBLEM — M47.816 LUMBAR SPONDYLOSIS: Status: ACTIVE | Noted: 2017-06-24

## 2017-06-24 LAB
QUANTIFERON (R) TB GOLD (INCUBATED): NEGATIVE
QUANTIFERON MITOGEN: >10 IU/ML
QUANTIFERON NIL: 0.04 IU/ML
QUANTIFERON TB AG MINUS NIL: 0.05 IU/ML (ref 0–0.34)

## 2017-06-24 PROCEDURE — 6370000000 HC RX 637 (ALT 250 FOR IP): Performed by: PSYCHIATRY & NEUROLOGY

## 2017-06-24 PROCEDURE — 6370000000 HC RX 637 (ALT 250 FOR IP): Performed by: PHYSICIAN ASSISTANT

## 2017-06-24 PROCEDURE — 6370000000 HC RX 637 (ALT 250 FOR IP): Performed by: INTERNAL MEDICINE

## 2017-06-24 PROCEDURE — 94640 AIRWAY INHALATION TREATMENT: CPT

## 2017-06-24 PROCEDURE — 1240000000 HC EMOTIONAL WELLNESS R&B

## 2017-06-24 PROCEDURE — 6370000000 HC RX 637 (ALT 250 FOR IP): Performed by: ANESTHESIOLOGY

## 2017-06-24 RX ORDER — LIDOCAINE 50 MG/G
3 PATCH TOPICAL DAILY
Qty: 90 PATCH | Refills: 2 | Status: SHIPPED | OUTPATIENT
Start: 2017-06-24 | End: 2018-06-18 | Stop reason: ALTCHOICE

## 2017-06-24 RX ORDER — ACETAMINOPHEN 325 MG/1
650 TABLET ORAL EVERY 4 HOURS PRN
Qty: 120 TABLET | Refills: 1 | Status: SHIPPED | OUTPATIENT
Start: 2017-06-24 | End: 2018-06-18 | Stop reason: ALTCHOICE

## 2017-06-24 RX ORDER — TRAMADOL HYDROCHLORIDE 50 MG/1
50 TABLET ORAL EVERY 6 HOURS PRN
Qty: 28 TABLET | Refills: 0 | Status: SHIPPED | OUTPATIENT
Start: 2017-06-24 | End: 2017-07-01

## 2017-06-24 RX ORDER — TIZANIDINE 4 MG/1
4 TABLET ORAL EVERY 6 HOURS PRN
Qty: 45 TABLET | Refills: 1 | Status: SHIPPED | OUTPATIENT
Start: 2017-06-24 | End: 2018-06-18 | Stop reason: ALTCHOICE

## 2017-06-24 RX ORDER — MELOXICAM 7.5 MG/1
7.5 TABLET ORAL DAILY
Qty: 60 TABLET | Refills: 2 | Status: SHIPPED | OUTPATIENT
Start: 2017-06-24 | End: 2018-06-18 | Stop reason: ALTCHOICE

## 2017-06-24 RX ADMIN — OXCARBAZEPINE 150 MG: 150 TABLET, FILM COATED ORAL at 10:02

## 2017-06-24 RX ADMIN — TRAMADOL HYDROCHLORIDE 50 MG: 50 TABLET, FILM COATED ORAL at 21:02

## 2017-06-24 RX ADMIN — ALPRAZOLAM 1 MG: 0.5 TABLET ORAL at 21:02

## 2017-06-24 RX ADMIN — ARIPIPRAZOLE 2.5 MG: 5 TABLET ORAL at 21:01

## 2017-06-24 RX ADMIN — PREDNISONE 10 MG: 10 TABLET ORAL at 10:00

## 2017-06-24 RX ADMIN — OXCARBAZEPINE 150 MG: 150 TABLET, FILM COATED ORAL at 13:58

## 2017-06-24 RX ADMIN — MELOXICAM 7.5 MG: 7.5 TABLET ORAL at 10:00

## 2017-06-24 RX ADMIN — TIZANIDINE 4 MG: 4 TABLET ORAL at 21:02

## 2017-06-24 RX ADMIN — ARIPIPRAZOLE 2.5 MG: 5 TABLET ORAL at 09:59

## 2017-06-24 RX ADMIN — ASPIRIN 81 MG: 81 TABLET, COATED ORAL at 10:00

## 2017-06-24 RX ADMIN — CLOPIDOGREL BISULFATE 75 MG: 75 TABLET ORAL at 10:00

## 2017-06-24 RX ADMIN — Medication 2 PUFF: at 19:46

## 2017-06-24 RX ADMIN — TIZANIDINE 4 MG: 4 TABLET ORAL at 10:00

## 2017-06-24 RX ADMIN — ALPRAZOLAM 1 MG: 0.5 TABLET ORAL at 10:09

## 2017-06-24 RX ADMIN — OXCARBAZEPINE 150 MG: 150 TABLET, FILM COATED ORAL at 21:01

## 2017-06-24 RX ADMIN — PANTOPRAZOLE SODIUM 40 MG: 40 TABLET, DELAYED RELEASE ORAL at 06:33

## 2017-06-24 RX ADMIN — Medication 2 PUFF: at 08:28

## 2017-06-24 RX ADMIN — TRAMADOL HYDROCHLORIDE 50 MG: 50 TABLET, FILM COATED ORAL at 13:58

## 2017-06-24 RX ADMIN — SERTRALINE HYDROCHLORIDE 50 MG: 50 TABLET ORAL at 10:00

## 2017-06-24 RX ADMIN — TIZANIDINE 4 MG: 4 TABLET ORAL at 13:58

## 2017-06-24 ASSESSMENT — PAIN SCALES - GENERAL
PAINLEVEL_OUTOF10: 8
PAINLEVEL_OUTOF10: 6
PAINLEVEL_OUTOF10: 8
PAINLEVEL_OUTOF10: 8

## 2017-06-25 VITALS
RESPIRATION RATE: 20 BRPM | WEIGHT: 130 LBS | TEMPERATURE: 98 F | HEART RATE: 84 BPM | SYSTOLIC BLOOD PRESSURE: 93 MMHG | HEIGHT: 69 IN | BODY MASS INDEX: 19.26 KG/M2 | OXYGEN SATURATION: 98 % | DIASTOLIC BLOOD PRESSURE: 65 MMHG

## 2017-06-25 PROCEDURE — 94760 N-INVAS EAR/PLS OXIMETRY 1: CPT

## 2017-06-25 PROCEDURE — 6370000000 HC RX 637 (ALT 250 FOR IP): Performed by: ANESTHESIOLOGY

## 2017-06-25 PROCEDURE — 6370000000 HC RX 637 (ALT 250 FOR IP): Performed by: PSYCHIATRY & NEUROLOGY

## 2017-06-25 PROCEDURE — 6370000000 HC RX 637 (ALT 250 FOR IP): Performed by: PHYSICIAN ASSISTANT

## 2017-06-25 PROCEDURE — 94640 AIRWAY INHALATION TREATMENT: CPT

## 2017-06-25 PROCEDURE — 6370000000 HC RX 637 (ALT 250 FOR IP): Performed by: INTERNAL MEDICINE

## 2017-06-25 RX ORDER — ALBUTEROL SULFATE 90 UG/1
2 AEROSOL, METERED RESPIRATORY (INHALATION) EVERY 6 HOURS PRN
Qty: 1 INHALER | Refills: 0 | Status: SHIPPED | OUTPATIENT
Start: 2017-06-25 | End: 2022-04-14 | Stop reason: ALTCHOICE

## 2017-06-25 RX ORDER — PREDNISONE 10 MG/1
10 TABLET ORAL DAILY
Qty: 3 TABLET | Refills: 0 | Status: SHIPPED | OUTPATIENT
Start: 2017-06-24 | End: 2017-06-28

## 2017-06-25 RX ORDER — PANTOPRAZOLE SODIUM 40 MG/1
40 TABLET, DELAYED RELEASE ORAL
Qty: 30 TABLET | Refills: 0 | Status: SHIPPED | OUTPATIENT
Start: 2017-06-25 | End: 2018-06-18 | Stop reason: ALTCHOICE

## 2017-06-25 RX ADMIN — CLOPIDOGREL BISULFATE 75 MG: 75 TABLET ORAL at 10:32

## 2017-06-25 RX ADMIN — ALPRAZOLAM 1 MG: 0.5 TABLET ORAL at 10:32

## 2017-06-25 RX ADMIN — ARIPIPRAZOLE 2.5 MG: 5 TABLET ORAL at 10:33

## 2017-06-25 RX ADMIN — ASPIRIN 81 MG: 81 TABLET, COATED ORAL at 10:32

## 2017-06-25 RX ADMIN — PREDNISONE 10 MG: 10 TABLET ORAL at 10:32

## 2017-06-25 RX ADMIN — TIZANIDINE 4 MG: 4 TABLET ORAL at 10:31

## 2017-06-25 RX ADMIN — Medication 2 PUFF: at 09:11

## 2017-06-25 RX ADMIN — PANTOPRAZOLE SODIUM 40 MG: 40 TABLET, DELAYED RELEASE ORAL at 06:43

## 2017-06-25 RX ADMIN — SERTRALINE HYDROCHLORIDE 50 MG: 50 TABLET ORAL at 10:33

## 2017-06-25 RX ADMIN — OXCARBAZEPINE 150 MG: 150 TABLET, FILM COATED ORAL at 10:35

## 2017-06-25 RX ADMIN — TRAMADOL HYDROCHLORIDE 50 MG: 50 TABLET, FILM COATED ORAL at 10:32

## 2017-06-25 RX ADMIN — HYDROXYZINE PAMOATE 50 MG: 50 CAPSULE ORAL at 03:15

## 2017-06-25 RX ADMIN — TRAMADOL HYDROCHLORIDE 50 MG: 50 TABLET, FILM COATED ORAL at 03:15

## 2017-06-25 ASSESSMENT — PAIN SCALES - GENERAL
PAINLEVEL_OUTOF10: 8
PAINLEVEL_OUTOF10: 8
PAINLEVEL_OUTOF10: 6

## 2018-06-18 ENCOUNTER — OFFICE VISIT (OUTPATIENT)
Dept: PULMONOLOGY | Age: 58
End: 2018-06-18
Payer: MEDICARE

## 2018-06-18 VITALS
BODY MASS INDEX: 19.55 KG/M2 | DIASTOLIC BLOOD PRESSURE: 70 MMHG | HEART RATE: 79 BPM | HEIGHT: 69 IN | SYSTOLIC BLOOD PRESSURE: 100 MMHG | TEMPERATURE: 97.1 F | RESPIRATION RATE: 16 BRPM | WEIGHT: 132 LBS | OXYGEN SATURATION: 97 %

## 2018-06-18 DIAGNOSIS — R91.8 LUNG MASS: Primary | ICD-10-CM

## 2018-06-18 PROCEDURE — 99204 OFFICE O/P NEW MOD 45 MIN: CPT | Performed by: INTERNAL MEDICINE

## 2018-06-18 PROCEDURE — G8598 ASA/ANTIPLAT THER USED: HCPCS | Performed by: INTERNAL MEDICINE

## 2018-06-18 PROCEDURE — 3017F COLORECTAL CA SCREEN DOC REV: CPT | Performed by: INTERNAL MEDICINE

## 2018-06-18 PROCEDURE — G8420 CALC BMI NORM PARAMETERS: HCPCS | Performed by: INTERNAL MEDICINE

## 2018-06-18 PROCEDURE — G8427 DOCREV CUR MEDS BY ELIG CLIN: HCPCS | Performed by: INTERNAL MEDICINE

## 2018-06-18 PROCEDURE — 4004F PT TOBACCO SCREEN RCVD TLK: CPT | Performed by: INTERNAL MEDICINE

## 2018-06-18 RX ORDER — DILTIAZEM HYDROCHLORIDE 120 MG/1
CAPSULE, EXTENDED RELEASE ORAL
Refills: 11 | COMMUNITY
Start: 2018-06-02 | End: 2022-03-21

## 2018-06-18 RX ORDER — ATORVASTATIN CALCIUM 20 MG/1
TABLET, FILM COATED ORAL
Refills: 3 | COMMUNITY
Start: 2018-06-02

## 2018-06-18 ASSESSMENT — ENCOUNTER SYMPTOMS
SHORTNESS OF BREATH: 0
WHEEZING: 0
CHEST TIGHTNESS: 0
VOMITING: 0
DIARRHEA: 0
COUGH: 0
SORE THROAT: 0
SINUS PRESSURE: 0
NAUSEA: 0
ABDOMINAL PAIN: 0
RHINORRHEA: 0

## 2018-06-19 ENCOUNTER — TELEPHONE (OUTPATIENT)
Dept: PULMONOLOGY | Age: 58
End: 2018-06-19

## 2018-07-05 ENCOUNTER — OFFICE VISIT (OUTPATIENT)
Dept: PULMONOLOGY | Age: 58
End: 2018-07-05
Payer: MEDICARE

## 2018-07-05 VITALS
HEART RATE: 77 BPM | TEMPERATURE: 96.4 F | DIASTOLIC BLOOD PRESSURE: 70 MMHG | WEIGHT: 130.6 LBS | OXYGEN SATURATION: 96 % | BODY MASS INDEX: 19.34 KG/M2 | HEIGHT: 69 IN | SYSTOLIC BLOOD PRESSURE: 118 MMHG

## 2018-07-05 DIAGNOSIS — R91.8 LUNG MASS: Primary | ICD-10-CM

## 2018-07-05 DIAGNOSIS — J43.9 PULMONARY EMPHYSEMA, UNSPECIFIED EMPHYSEMA TYPE (HCC): ICD-10-CM

## 2018-07-05 DIAGNOSIS — Z85.118 HISTORY OF LUNG CANCER: ICD-10-CM

## 2018-07-05 PROCEDURE — 3017F COLORECTAL CA SCREEN DOC REV: CPT | Performed by: INTERNAL MEDICINE

## 2018-07-05 PROCEDURE — G8427 DOCREV CUR MEDS BY ELIG CLIN: HCPCS | Performed by: INTERNAL MEDICINE

## 2018-07-05 PROCEDURE — G8598 ASA/ANTIPLAT THER USED: HCPCS | Performed by: INTERNAL MEDICINE

## 2018-07-05 PROCEDURE — G8420 CALC BMI NORM PARAMETERS: HCPCS | Performed by: INTERNAL MEDICINE

## 2018-07-05 PROCEDURE — 4004F PT TOBACCO SCREEN RCVD TLK: CPT | Performed by: INTERNAL MEDICINE

## 2018-07-05 PROCEDURE — G8926 SPIRO NO PERF OR DOC: HCPCS | Performed by: INTERNAL MEDICINE

## 2018-07-05 PROCEDURE — 3023F SPIROM DOC REV: CPT | Performed by: INTERNAL MEDICINE

## 2018-07-05 PROCEDURE — 99214 OFFICE O/P EST MOD 30 MIN: CPT | Performed by: INTERNAL MEDICINE

## 2018-07-05 RX ORDER — IPRATROPIUM BROMIDE AND ALBUTEROL SULFATE 2.5; .5 MG/3ML; MG/3ML
SOLUTION RESPIRATORY (INHALATION)
Refills: 0 | COMMUNITY
Start: 2018-04-24 | End: 2022-03-21

## 2018-07-05 RX ORDER — DOXYCYCLINE HYCLATE 100 MG/1
CAPSULE ORAL
Refills: 0 | Status: ON HOLD | COMMUNITY
Start: 2018-05-28 | End: 2018-07-13

## 2018-07-05 RX ORDER — NITROGLYCERIN 0.4 MG/1
TABLET SUBLINGUAL
Refills: 1 | COMMUNITY
Start: 2018-04-23

## 2018-07-05 ASSESSMENT — ENCOUNTER SYMPTOMS
WHEEZING: 0
VOMITING: 0
RHINORRHEA: 0
SHORTNESS OF BREATH: 1
DIARRHEA: 0
NAUSEA: 0
COUGH: 1
SINUS PRESSURE: 0
CHEST TIGHTNESS: 0
ABDOMINAL PAIN: 0
SORE THROAT: 0

## 2018-07-05 NOTE — PROGRESS NOTES
Other Topics Concern    Not on file     Social History Narrative    No narrative on file         Review of Systems   Constitutional: Positive for fatigue. Negative for chills, diaphoresis and fever. HENT: Negative for congestion, postnasal drip, rhinorrhea, sinus pressure, sneezing and sore throat. Eyes: Positive for visual disturbance. Respiratory: Positive for cough and shortness of breath. Negative for chest tightness and wheezing. Cardiovascular: Negative for chest pain, palpitations and leg swelling. Gastrointestinal: Negative for abdominal pain, diarrhea, nausea and vomiting. Genitourinary: Negative for difficulty urinating and hematuria. Musculoskeletal: Negative for arthralgias, joint swelling and myalgias. Skin: Negative for rash. Allergic/Immunologic: Negative for environmental allergies. Neurological: Negative for dizziness, tremors, weakness and headaches. Psychiatric/Behavioral: Negative for behavioral problems and sleep disturbance. Objective:     Vitals:    07/05/18 1055   BP: 118/70   Site: Left Arm   Pulse: 77   Temp: 96.4 °F (35.8 °C)   TempSrc: Tympanic   SpO2: 96%   Weight: 130 lb 9.6 oz (59.2 kg)   Height: 5' 9\" (1.753 m)         Physical Exam   Constitutional: He is oriented to person, place, and time. He appears well-developed and well-nourished. HENT:   Head: Normocephalic and atraumatic. Mouth/Throat: Oropharynx is clear and moist.   Eyes:   Left eye inoculation   Neck: Normal range of motion. Neck supple. No JVD present. No tracheal deviation present. No thyromegaly present. Cardiovascular: Normal rate and regular rhythm. Exam reveals no gallop. No murmur heard. Pulmonary/Chest: Effort normal and breath sounds normal. He has no wheezes. He has no rales. He exhibits no tenderness. Diminished breath sounds clear otherwise   Abdominal: He exhibits no distension. Musculoskeletal: Normal range of motion. He exhibits no edema.    Neurological: He is alert and oriented to person, place, and time. He has normal reflexes. Skin: Skin is warm and dry. No rash noted. Psychiatric: He has a normal mood and affect. Assessment:      Diagnosis Orders   1. Lung mass  Bronchoscopy   2. Pulmonary emphysema, unspecified emphysema type ([facility]     3. History of lung cancer       CT findings in comparison to others were all discussed today at length. Endoscopy to rule out right hilar endobronchial lesion was discussed otherwise follow-up surveillance with CT scan PET scans was also discussed and recommended      Plan:     Orders Placed This Encounter   Procedures    Bronchoscopy     Standing Status:   Future     Standing Expiration Date:   7/13/2018     No orders of the defined types were placed in this encounter. Return in about 3 weeks (around 7/26/2018) for review tests, re-evaluation.       Kristina Santoro MD

## 2018-07-13 ENCOUNTER — HOSPITAL ENCOUNTER (OUTPATIENT)
Age: 58
Setting detail: OUTPATIENT SURGERY
Discharge: HOME | End: 2018-07-13
Attending: INTERNAL MEDICINE | Admitting: INTERNAL MEDICINE
Payer: MEDICARE

## 2018-07-13 ENCOUNTER — ANESTHESIA (OUTPATIENT)
Dept: OPERATING ROOM | Age: 58
End: 2018-07-13
Payer: MEDICARE

## 2018-07-13 ENCOUNTER — ANESTHESIA EVENT (OUTPATIENT)
Dept: OPERATING ROOM | Age: 58
End: 2018-07-13
Payer: MEDICARE

## 2018-07-13 VITALS
DIASTOLIC BLOOD PRESSURE: 70 MMHG | SYSTOLIC BLOOD PRESSURE: 116 MMHG | RESPIRATION RATE: 18 BRPM | OXYGEN SATURATION: 100 %

## 2018-07-13 VITALS
WEIGHT: 130 LBS | SYSTOLIC BLOOD PRESSURE: 116 MMHG | OXYGEN SATURATION: 96 % | HEIGHT: 69 IN | TEMPERATURE: 97.4 F | BODY MASS INDEX: 19.26 KG/M2 | DIASTOLIC BLOOD PRESSURE: 52 MMHG | HEART RATE: 72 BPM | RESPIRATION RATE: 20 BRPM

## 2018-07-13 PROCEDURE — 3700000000 HC ANESTHESIA ATTENDED CARE: Performed by: INTERNAL MEDICINE

## 2018-07-13 PROCEDURE — 3700000001 HC ADD 15 MINUTES (ANESTHESIA): Performed by: INTERNAL MEDICINE

## 2018-07-13 PROCEDURE — 3600000003 HC SURGERY LEVEL 3 BASE: Performed by: INTERNAL MEDICINE

## 2018-07-13 PROCEDURE — 7100000001 HC PACU RECOVERY - ADDTL 15 MIN: Performed by: INTERNAL MEDICINE

## 2018-07-13 PROCEDURE — 31622 DX BRONCHOSCOPE/WASH: CPT | Performed by: INTERNAL MEDICINE

## 2018-07-13 PROCEDURE — 3600000013 HC SURGERY LEVEL 3 ADDTL 15MIN: Performed by: INTERNAL MEDICINE

## 2018-07-13 PROCEDURE — 7100000011 HC PHASE II RECOVERY - ADDTL 15 MIN: Performed by: INTERNAL MEDICINE

## 2018-07-13 PROCEDURE — 6360000002 HC RX W HCPCS: Performed by: NURSE ANESTHETIST, CERTIFIED REGISTERED

## 2018-07-13 PROCEDURE — 2580000003 HC RX 258: Performed by: NURSE PRACTITIONER

## 2018-07-13 PROCEDURE — 2709999900 HC NON-CHARGEABLE SUPPLY: Performed by: INTERNAL MEDICINE

## 2018-07-13 PROCEDURE — 2500000003 HC RX 250 WO HCPCS: Performed by: NURSE PRACTITIONER

## 2018-07-13 PROCEDURE — 7100000000 HC PACU RECOVERY - FIRST 15 MIN: Performed by: INTERNAL MEDICINE

## 2018-07-13 PROCEDURE — 7100000010 HC PHASE II RECOVERY - FIRST 15 MIN: Performed by: INTERNAL MEDICINE

## 2018-07-13 RX ORDER — SODIUM CHLORIDE 0.9 % (FLUSH) 0.9 %
10 SYRINGE (ML) INJECTION EVERY 12 HOURS SCHEDULED
Status: DISCONTINUED | OUTPATIENT
Start: 2018-07-13 | End: 2018-07-13 | Stop reason: HOSPADM

## 2018-07-13 RX ORDER — METOCLOPRAMIDE HYDROCHLORIDE 5 MG/ML
10 INJECTION INTRAMUSCULAR; INTRAVENOUS
Status: DISCONTINUED | OUTPATIENT
Start: 2018-07-13 | End: 2018-07-13 | Stop reason: HOSPADM

## 2018-07-13 RX ORDER — LIDOCAINE HYDROCHLORIDE 10 MG/ML
1 INJECTION, SOLUTION EPIDURAL; INFILTRATION; INTRACAUDAL; PERINEURAL
Status: COMPLETED | OUTPATIENT
Start: 2018-07-13 | End: 2018-07-13

## 2018-07-13 RX ORDER — SODIUM CHLORIDE, SODIUM LACTATE, POTASSIUM CHLORIDE, CALCIUM CHLORIDE 600; 310; 30; 20 MG/100ML; MG/100ML; MG/100ML; MG/100ML
INJECTION, SOLUTION INTRAVENOUS CONTINUOUS
Status: DISCONTINUED | OUTPATIENT
Start: 2018-07-13 | End: 2018-07-13 | Stop reason: HOSPADM

## 2018-07-13 RX ORDER — SODIUM CHLORIDE 0.9 % (FLUSH) 0.9 %
10 SYRINGE (ML) INJECTION PRN
Status: DISCONTINUED | OUTPATIENT
Start: 2018-07-13 | End: 2018-07-13 | Stop reason: HOSPADM

## 2018-07-13 RX ORDER — ONDANSETRON 2 MG/ML
4 INJECTION INTRAMUSCULAR; INTRAVENOUS
Status: DISCONTINUED | OUTPATIENT
Start: 2018-07-13 | End: 2018-07-13 | Stop reason: HOSPADM

## 2018-07-13 RX ORDER — PROPOFOL 10 MG/ML
INJECTION, EMULSION INTRAVENOUS CONTINUOUS PRN
Status: DISCONTINUED | OUTPATIENT
Start: 2018-07-13 | End: 2018-07-13 | Stop reason: SDUPTHER

## 2018-07-13 RX ORDER — PROPOFOL 10 MG/ML
INJECTION, EMULSION INTRAVENOUS PRN
Status: DISCONTINUED | OUTPATIENT
Start: 2018-07-13 | End: 2018-07-13 | Stop reason: SDUPTHER

## 2018-07-13 RX ORDER — DIPHENHYDRAMINE HYDROCHLORIDE 50 MG/ML
12.5 INJECTION INTRAMUSCULAR; INTRAVENOUS
Status: DISCONTINUED | OUTPATIENT
Start: 2018-07-13 | End: 2018-07-13 | Stop reason: HOSPADM

## 2018-07-13 RX ORDER — SODIUM CHLORIDE 9 MG/ML
INJECTION, SOLUTION INTRAVENOUS CONTINUOUS
Status: DISCONTINUED | OUTPATIENT
Start: 2018-07-13 | End: 2018-07-13 | Stop reason: HOSPADM

## 2018-07-13 RX ORDER — HYDROCODONE BITARTRATE AND ACETAMINOPHEN 5; 325 MG/1; MG/1
1 TABLET ORAL PRN
Status: DISCONTINUED | OUTPATIENT
Start: 2018-07-13 | End: 2018-07-13 | Stop reason: HOSPADM

## 2018-07-13 RX ORDER — FENTANYL CITRATE 50 UG/ML
50 INJECTION, SOLUTION INTRAMUSCULAR; INTRAVENOUS EVERY 10 MIN PRN
Status: DISCONTINUED | OUTPATIENT
Start: 2018-07-13 | End: 2018-07-13 | Stop reason: HOSPADM

## 2018-07-13 RX ORDER — LIDOCAINE HYDROCHLORIDE 10 MG/ML
1 INJECTION, SOLUTION EPIDURAL; INFILTRATION; INTRACAUDAL; PERINEURAL
Status: DISCONTINUED | OUTPATIENT
Start: 2018-07-13 | End: 2018-07-13 | Stop reason: HOSPADM

## 2018-07-13 RX ORDER — HYDROCODONE BITARTRATE AND ACETAMINOPHEN 5; 325 MG/1; MG/1
2 TABLET ORAL PRN
Status: DISCONTINUED | OUTPATIENT
Start: 2018-07-13 | End: 2018-07-13 | Stop reason: HOSPADM

## 2018-07-13 RX ORDER — MEPERIDINE HYDROCHLORIDE 25 MG/ML
12.5 INJECTION INTRAMUSCULAR; INTRAVENOUS; SUBCUTANEOUS EVERY 5 MIN PRN
Status: DISCONTINUED | OUTPATIENT
Start: 2018-07-13 | End: 2018-07-13 | Stop reason: HOSPADM

## 2018-07-13 RX ADMIN — SODIUM CHLORIDE, POTASSIUM CHLORIDE, SODIUM LACTATE AND CALCIUM CHLORIDE: 600; 310; 30; 20 INJECTION, SOLUTION INTRAVENOUS at 11:14

## 2018-07-13 RX ADMIN — LIDOCAINE HYDROCHLORIDE 1 ML: 10 INJECTION, SOLUTION EPIDURAL; INFILTRATION; INTRACAUDAL; PERINEURAL at 11:14

## 2018-07-13 RX ADMIN — PROPOFOL 60 MG: 10 INJECTION, EMULSION INTRAVENOUS at 13:15

## 2018-07-13 RX ADMIN — PROPOFOL 100 MCG/KG/MIN: 10 INJECTION, EMULSION INTRAVENOUS at 13:15

## 2018-07-13 ASSESSMENT — PULMONARY FUNCTION TESTS
PIF_VALUE: 1
PIF_VALUE: 0
PIF_VALUE: 1
PIF_VALUE: 0
PIF_VALUE: 1
PIF_VALUE: 0
PIF_VALUE: 0
PIF_VALUE: 1

## 2018-07-13 ASSESSMENT — PAIN - FUNCTIONAL ASSESSMENT: PAIN_FUNCTIONAL_ASSESSMENT: 0-10

## 2018-07-13 ASSESSMENT — COPD QUESTIONNAIRES: CAT_SEVERITY: NO INTERVAL CHANGE

## 2018-07-13 NOTE — PROGRESS NOTES
Received in PACU from OR accompanied by REINALDO Griffith CRNA. Awake answering questions appropriately. Speech clear. Moves times four. No complaints offered.

## 2018-07-13 NOTE — ANESTHESIA POSTPROCEDURE EVALUATION
Department of Anesthesiology  Postprocedure Note    Patient: Shane Morales  MRN: [id number]  YOB: 1960  Date of evaluation: 7/13/2018  Time:  1:30 PM     Procedure Summary     Date:  07/13/18 Room / Location:  [address] Files OR    Anesthesia Start:  1311 Anesthesia Stop:  1330    Procedure:  BRONCHOSCOPY (N/A ) Diagnosis:  (LUNG MASS)    Surgeon:  Mickey Dewitt MD Responsible Provider:  Fernando Doll MD    Anesthesia Type:  MAC ASA Status:  4          Anesthesia Type: MAC    Iain Phase I:      Iain Phase II:      Last vitals: Reviewed and per EMR flowsheets.        Anesthesia Post Evaluation    Patient location during evaluation: PACU  Patient participation: complete - patient participated  Level of consciousness: awake and awake and alert  Airway patency: patent  Nausea & Vomiting: no nausea and no vomiting  Complications: no  Cardiovascular status: blood pressure returned to baseline and hemodynamically stable  Respiratory status: acceptable and room air  Hydration status: euvolemic

## 2018-07-13 NOTE — H&P
HPI  Patient was referred regarding lung mass and history of lung cancer. Since his last visit he had a follow-up CT of the chest done which continued show bullous emphysema in the upper lobes, residual fibrotic appearing nodular area in the right upper lobe in addition to hilar and mediastinal adenopathy unchanged compared to prior CTs in May and April. Patient reports mild periodic cough as well as recent increase in his shortness breath when he is out in this hot humid weather.     Allergies:  Patient has no known allergies. Past Medical History        Past Medical History:   Diagnosis Date    Arthritis      Asthma      Bipolar disorder ([address]      CAD (coronary artery disease)      Cancer (HCC)       R lung cancer, bladder cancer 6 times     Cerebral artery occlusion with cerebral infarction ([address]      COPD (chronic obstructive pulmonary disease) (HCC)      Depression      Hepatitis C      Hyperlipidemia      Kidney stone 2015    Melanoma ([address] 2011     L eye was removed     Melanoma ([facility] 2011     L eye was removed          Past Surgical History         Past Surgical History:   Procedure Laterality Date    CORONARY ANGIOPLASTY WITH STENT PLACEMENT Left       stents placed to L carotid (2015) L arm (6/2017)    EYE SURGERY         melanoma took L eye    VASCULAR SURGERY             Family History   History reviewed. No pertinent family history. Social History   Social History            Social History    Marital status: Single       Spouse name: N/A    Number of children: N/A    Years of education: N/A          Occupational History    Not on file.             Social History Main Topics    Smoking status: Current Every Day Smoker       Packs/day: 1.00       Years: 45.00       Types: Cigarettes    Smokeless tobacco: Never Used    Alcohol use No    Drug use:  No         Comment: repoerts using occassionally, last use 2 days ago    Sexual activity: Not on file           Other Topics

## 2018-07-13 NOTE — PROCEDURES
Colin Rogers is a 65 y.o. male patient. No diagnosis found. Past Medical History:   Diagnosis Date    Arthritis     Asthma     B[facility] disorder ([facility]     CAD (coronary artery disease)     Cancer ([facility]     R lung cancer, bladder cancer 6 times     Cerebral artery occlusion with cerebral infarction ([facility]     COPD (chronic obstructive pulmonary disease) ([facility]     Depression     Hepatitis C     Hyperlipidemia     Kidney stone 2015    Melanoma ([facility] [address] [facility] 2011    L eye was removed     [facility] 2011    L eye was removed      Blood pressure 107/66, pulse 61, temperature 97.4 °F (36.3 °C), temperature source Temporal, resp. rate 18, height 5' 9\" (1.753 m), weight 130 lb (59 kg), SpO2 100 %. Procedures    Indications:   History of lung cancer, Residual mass    Consent:   Obtained from patient prior to procedure after explaining indications, risks, and alternatives    Sedation:   Deep sedation applied with Diprivan    Procedure:   Patient was adequately sedated first, he had continuous oxygen monitoring as well as hemodynamic monitoring throughout the procedure. Fiberoptic bronchoscope was passed through the right nares. Vocal cords were visualized and appeared normal and moved normally. Bronchoscopy scope was then advanced into the trachea with no endotracheal lesions or abnormalities noted. Bronchoscope was then advanced into the right main bronchus and right upper, right lower, and right middle lobes were all inspected thoroughly and no endobronchial lesions or abnormalities seen. Bronchoscope was then withdrawn to the teresa level and advanced into the left main bronchus. Left upper lobe, lingula, and left lower lobe bronchi were all inspected thoroughly and no endobronchial abnormalities noted  Bronchoscope was then withdrawn out to the teresa and completely withdrawn out of the patient. Patient tolerated the procedure very well with no complications noted.     Postoperative diagnosis:   Lung

## 2018-07-13 NOTE — ANESTHESIA PRE PROCEDURE
lactated ringers infusion   Intravenous Continuous Adele [place], APRN -  mL/hr at 07/13/18 1114      sodium chloride flush 0.9 % injection 10 mL  10 mL Intravenous 2 times per day Adele [place], APRN - CNP        sodium chloride flush 0.9 % injection 10 mL  10 mL Intravenous PRN Adele [place], APRN - CNP        fentaNYL (SUBLIMAZE) injection 50 mcg  50 mcg Intravenous Q10 Min PRN Kade Ferguson MD        HYDROmorphone (DILAUDID) injection 0.5 mg  0.5 mg Intravenous Q10 Min PRN Kade Ferguson MD        HYDROcodone-acetaminophen [facility]) 5-325 MG per tablet 1 tablet  1 tablet Oral PRN Kade Ferguson MD        Or    HYDROcodone-acetaminophen [facility]) 5-325 MG per tablet 2 tablet  2 tablet Oral PRN Kade Ferguson MD        diphenhydrAMINE (BENADRYL) injection 12.5 mg  12.5 mg Intravenous Once PRN Kade Ferguson MD        ondansetron [facility]) injection 4 mg  4 mg Intravenous Once PRN Kade Ferguson MD        metoclopramide [facility]) injection 10 mg  10 mg Intravenous Once PRN Kade Ferguson MD        meperidine (DEMEROL) injection 12.5 mg  12.5 mg Intravenous Q5 Min PRN Kade Ferguson MD        0.9 % sodium chloride infusion   Intravenous Continuous Kade Ferguson MD        sodium chloride flush 0.9 % injection 10 mL  10 mL Intravenous 2 times per day Kade Ferguson MD        sodium chloride flush 0.9 % injection 10 mL  10 mL Intravenous PRN Kade Ferguson MD        lidocaine PF 1 % injection 1 mL  1 mL Intradermal Once PRN Kade Ferguson MD           Allergies:  No Known Allergies    Problem List:    Patient Active Problem List   Diagnosis Code    HCV (hepatitis C virus) B19.20    PVD (peripheral vascular disease) ([facility] I73.9    CAD (coronary artery disease) I25.10    History of MI (myocardial infarction) I25.2    History of stroke Z86.73    Melanoma ([facility] C43.9    COPD (chronic obstructive Date of last solid food consumption: 07/12/18    BMI:   Wt Readings from Last 3 Encounters:   07/13/18 130 lb (59 kg)   07/05/18 130 lb 9.6 oz (59.2 kg)   06/18/18 132 lb (59.9 kg)     Body mass index is 19.2 kg/m². CBC:   Lab Results   Component Value Date    WBC 7.3 06/17/2017    RBC 4.66 06/17/2017    HGB 16.2 06/17/2017    HCT 47.0 06/17/2017    .7 06/17/2017    RDW 14.7 06/17/2017     06/17/2017       CMP:   Lab Results   Component Value Date     06/11/2018    K 4.3 06/11/2018    CL 98 06/11/2018    CO2 27 06/11/2018    BUN 11 06/11/2018    CREATININE 1.03 06/11/2018    GFRAA >60.0 06/11/2018    LABGLOM >60.0 06/11/2018    GLUCOSE 95 06/11/2018    PROT 7.5 06/11/2018    CALCIUM 10.0 06/11/2018    BILITOT 0.7 06/11/2018    ALKPHOS 68 06/11/2018    AST 21 06/11/2018    ALT 16 06/11/2018       POC Tests: No results for input(s): POCGLU, POCNA, POCK, POCCL, POCBUN, POCHEMO, POCHCT in the last 72 hours. Coags: No results found for: PROTIME, INR, APTT    HCG (If Applicable): No results found for: PREGTESTUR, PREGSERUM, HCG, HCGQUANT     ABGs: No results found for: PHART, PO2ART, YYW3WDX, YPX1TGL, BEART, U6PAWGVV     Type & Screen (If Applicable):  No results found for: LABABO, [address]    Anesthesia Evaluation  Patient summary reviewed and Nursing notes reviewed no history of anesthetic complications:   Airway: Mallampati: II  TM distance: >3 FB   Neck ROM: full  Mouth opening: > = 3 FB Dental: normal exam         Pulmonary:normal exam    (+) COPD: no interval change,  asthma:                            Cardiovascular:    (+) CAD: no interval change,       ECG reviewed                        Neuro/Psych:   (+) CVA: no interval change,             GI/Hepatic/Renal:   (+) hepatitis: C,           Endo/Other: Negative Endo/Other ROS             Pt had PAT visit. Abdominal:           Vascular: negative vascular ROS.                                        Anesthesia Plan      MAC     ASA 4           MIPS: Prophylactic antiemetics administered. Anesthetic plan and risks discussed with patient. Plan discussed with CRNA.     Attending anesthesiologist reviewed and agrees with Pre Eval content              Sharmaine Torres MD   7/13/2018

## 2018-07-26 ENCOUNTER — OFFICE VISIT (OUTPATIENT)
Dept: PULMONOLOGY | Age: 58
End: 2018-07-26
Payer: MEDICARE

## 2018-07-26 VITALS
DIASTOLIC BLOOD PRESSURE: 70 MMHG | BODY MASS INDEX: 19.26 KG/M2 | WEIGHT: 130 LBS | OXYGEN SATURATION: 100 % | SYSTOLIC BLOOD PRESSURE: 108 MMHG | TEMPERATURE: 97.5 F | HEIGHT: 69 IN | HEART RATE: 99 BPM

## 2018-07-26 DIAGNOSIS — Z85.118 HISTORY OF LUNG CANCER: ICD-10-CM

## 2018-07-26 DIAGNOSIS — R91.8 LUNG MASS: ICD-10-CM

## 2018-07-26 DIAGNOSIS — J43.9 PULMONARY EMPHYSEMA, UNSPECIFIED EMPHYSEMA TYPE (HCC): Primary | ICD-10-CM

## 2018-07-26 PROCEDURE — G8926 SPIRO NO PERF OR DOC: HCPCS | Performed by: INTERNAL MEDICINE

## 2018-07-26 PROCEDURE — 3023F SPIROM DOC REV: CPT | Performed by: INTERNAL MEDICINE

## 2018-07-26 PROCEDURE — 3017F COLORECTAL CA SCREEN DOC REV: CPT | Performed by: INTERNAL MEDICINE

## 2018-07-26 PROCEDURE — G8420 CALC BMI NORM PARAMETERS: HCPCS | Performed by: INTERNAL MEDICINE

## 2018-07-26 PROCEDURE — 99214 OFFICE O/P EST MOD 30 MIN: CPT | Performed by: INTERNAL MEDICINE

## 2018-07-26 PROCEDURE — G8598 ASA/ANTIPLAT THER USED: HCPCS | Performed by: INTERNAL MEDICINE

## 2018-07-26 PROCEDURE — 4004F PT TOBACCO SCREEN RCVD TLK: CPT | Performed by: INTERNAL MEDICINE

## 2018-07-26 PROCEDURE — G8427 DOCREV CUR MEDS BY ELIG CLIN: HCPCS | Performed by: INTERNAL MEDICINE

## 2018-07-26 ASSESSMENT — ENCOUNTER SYMPTOMS
WHEEZING: 0
COUGH: 1
VOMITING: 0
SORE THROAT: 0
CHEST TIGHTNESS: 0
SHORTNESS OF BREATH: 1
NAUSEA: 0
ABDOMINAL PAIN: 0
SINUS PRESSURE: 0
RHINORRHEA: 0
DIARRHEA: 0

## 2018-07-26 NOTE — PROGRESS NOTES
Subjective: Tess Bower is a 65 y.o. male who complains today of:     Chief Complaint   Patient presents with    Follow-up       HPI  Patient is here for a follow-up visit regarding abnormal CT of the chest with history of lung cancer and COPD. Since previous visit he had a bronchoscopy done which showed no endobronchial lesions. Washings were obtained from the right upper lobe were abnormal density was noted and sent for cytology which came back negative for malignant cells. Patient reports no change in his symptoms is mainly complaining of fatigue, shortness breath on exertion, and mild chronic cough. Allergies:  Patient has no known allergies. Past Medical History:   Diagnosis Date    Arthritis     Asthma     Bipolar disorder ([address]     CAD (coronary artery disease)     Cancer ([facility]     R lung cancer, bladder cancer 6 times     Cerebral artery occlusion with cerebral infarction ([facility]     COPD (chronic obstructive pulmonary disease) ([facility]     Depression     Hepatitis C     Hyperlipidemia     Kidney stone 2015    Melanoma ([facility] 2011    L eye was removed     Melanoma ([facility] 2011    L eye was removed      Past Surgical History:   Procedure Laterality Date    CORONARY ANGIOPLASTY WITH STENT PLACEMENT Left     stents placed to L carotid (2015) L arm (6/2017)    EYE SURGERY      melanoma took L eye    [facility] [address] INCL FLUOR GDNCE DX W/CELL WASHG SPX N/A 7/13/2018    BRONCHOSCOPY performed by Blaze Cruz MD at [address] [place] [address]       History reviewed. No pertinent family history. Social History     Social History    Marital status: Single     Spouse name: N/A    Number of children: N/A    Years of education: N/A     Occupational History    Not on file.      Social History Main Topics    Smoking status: Current Every Day Smoker     Packs/day: 0.50     Years: 50.00     Types: Cigarettes    Smokeless tobacco: Never Used      Comment: Patient states he quit yesterday   

## 2022-01-01 ENCOUNTER — HOSPITAL ENCOUNTER (OUTPATIENT)
Dept: DATA CONVERSION | Facility: HOSPITAL | Age: 62
Discharge: HOME | End: 2022-05-11
Attending: UROLOGY | Admitting: UROLOGY

## 2022-01-01 DIAGNOSIS — R00.0 TACHYCARDIA, UNSPECIFIED: ICD-10-CM

## 2022-01-01 DIAGNOSIS — E83.42 HYPOMAGNESEMIA: ICD-10-CM

## 2022-01-01 DIAGNOSIS — Z85.118 PERSONAL HISTORY OF OTHER MALIGNANT NEOPLASM OF BRONCHUS AND LUNG: ICD-10-CM

## 2022-01-01 DIAGNOSIS — C67.2 MALIGNANT NEOPLASM OF LATERAL WALL OF BLADDER (MULTI): ICD-10-CM

## 2022-01-01 DIAGNOSIS — R55 SYNCOPE AND COLLAPSE: ICD-10-CM

## 2022-01-01 DIAGNOSIS — Z85.820 PERSONAL HISTORY OF MALIGNANT MELANOMA OF SKIN: ICD-10-CM

## 2022-01-01 DIAGNOSIS — C67.9 MALIGNANT NEOPLASM OF BLADDER, UNSPECIFIED (MULTI): ICD-10-CM

## 2022-01-01 DIAGNOSIS — I50.32 CHRONIC DIASTOLIC (CONGESTIVE) HEART FAILURE (MULTI): ICD-10-CM

## 2022-01-01 DIAGNOSIS — I25.10 ATHEROSCLEROTIC HEART DISEASE OF NATIVE CORONARY ARTERY WITHOUT ANGINA PECTORIS: ICD-10-CM

## 2022-01-01 DIAGNOSIS — I11.0 HYPERTENSIVE HEART DISEASE WITH HEART FAILURE (MULTI): ICD-10-CM

## 2022-01-01 DIAGNOSIS — Z20.822 CONTACT WITH AND (SUSPECTED) EXPOSURE TO COVID-19: ICD-10-CM

## 2022-01-01 DIAGNOSIS — R06.00 DYSPNEA, UNSPECIFIED: ICD-10-CM

## 2022-01-01 DIAGNOSIS — Z86.73 PERSONAL HISTORY OF TRANSIENT ISCHEMIC ATTACK (TIA), AND CEREBRAL INFARCTION WITHOUT RESIDUAL DEFICITS: ICD-10-CM

## 2022-01-01 DIAGNOSIS — J44.9 CHRONIC OBSTRUCTIVE PULMONARY DISEASE, UNSPECIFIED (MULTI): ICD-10-CM

## 2022-01-01 DIAGNOSIS — F17.200 NICOTINE DEPENDENCE, UNSPECIFIED, UNCOMPLICATED: ICD-10-CM

## 2022-01-01 LAB
COMPLETE PATHOLOGY REPORT: NORMAL
CONVERTED CLINICAL DIAGNOSIS-HISTORY: NORMAL
CONVERTED FINAL DIAGNOSIS: NORMAL
CONVERTED FINAL REPORT PDF LINK TO COPY AND PASTE: NORMAL
CONVERTED GROSS DESCRIPTION: NORMAL

## 2022-03-21 ENCOUNTER — OFFICE VISIT (OUTPATIENT)
Dept: PALLATIVE CARE | Age: 62
End: 2022-03-21
Payer: MEDICARE

## 2022-03-21 VITALS
TEMPERATURE: 100.9 F | RESPIRATION RATE: 16 BRPM | SYSTOLIC BLOOD PRESSURE: 132 MMHG | OXYGEN SATURATION: 97 % | WEIGHT: 135 LBS | BODY MASS INDEX: 19.94 KG/M2 | HEART RATE: 115 BPM | DIASTOLIC BLOOD PRESSURE: 72 MMHG

## 2022-03-21 DIAGNOSIS — C34.90 MALIGNANT NEOPLASM OF LUNG, UNSPECIFIED LATERALITY, UNSPECIFIED PART OF LUNG (HCC): ICD-10-CM

## 2022-03-21 DIAGNOSIS — K59.00 CONSTIPATION, UNSPECIFIED CONSTIPATION TYPE: ICD-10-CM

## 2022-03-21 DIAGNOSIS — F41.9 ANXIETY: ICD-10-CM

## 2022-03-21 DIAGNOSIS — C69.92 MALIGNANT MELANOMA OF LEFT EYE (HCC): ICD-10-CM

## 2022-03-21 DIAGNOSIS — R63.4 WEIGHT LOSS: ICD-10-CM

## 2022-03-21 DIAGNOSIS — G89.3 CANCER ASSOCIATED PAIN: Primary | ICD-10-CM

## 2022-03-21 DIAGNOSIS — F17.210 TOBACCO DEPENDENCE DUE TO CIGARETTES: ICD-10-CM

## 2022-03-21 DIAGNOSIS — C67.9 BLADDER CARCINOMA (HCC): ICD-10-CM

## 2022-03-21 DIAGNOSIS — Z51.5 PALLIATIVE CARE ENCOUNTER: ICD-10-CM

## 2022-03-21 DIAGNOSIS — K92.1 COMPLAINT OF MELENA: ICD-10-CM

## 2022-03-21 PROBLEM — E78.00 PURE HYPERCHOLESTEROLEMIA: Status: ACTIVE | Noted: 2019-03-23

## 2022-03-21 PROBLEM — I10 ESSENTIAL HYPERTENSION: Status: ACTIVE | Noted: 2019-03-23

## 2022-03-21 PROBLEM — Z90.01 ACQUIRED ABSENCE OF EYE: Status: ACTIVE | Noted: 2018-05-27

## 2022-03-21 PROBLEM — C69.90 MALIGNANT MELANOMA OF EYE (HCC): Status: ACTIVE | Noted: 2022-03-21

## 2022-03-21 PROCEDURE — 99497 ADVNCD CARE PLAN 30 MIN: CPT | Performed by: NURSE PRACTITIONER

## 2022-03-21 PROCEDURE — 4004F PT TOBACCO SCREEN RCVD TLK: CPT | Performed by: NURSE PRACTITIONER

## 2022-03-21 PROCEDURE — G8427 DOCREV CUR MEDS BY ELIG CLIN: HCPCS | Performed by: NURSE PRACTITIONER

## 2022-03-21 PROCEDURE — G8420 CALC BMI NORM PARAMETERS: HCPCS | Performed by: NURSE PRACTITIONER

## 2022-03-21 PROCEDURE — G8484 FLU IMMUNIZE NO ADMIN: HCPCS | Performed by: NURSE PRACTITIONER

## 2022-03-21 PROCEDURE — 3017F COLORECTAL CA SCREEN DOC REV: CPT | Performed by: NURSE PRACTITIONER

## 2022-03-21 PROCEDURE — 99205 OFFICE O/P NEW HI 60 MIN: CPT | Performed by: NURSE PRACTITIONER

## 2022-03-21 RX ORDER — OXYCODONE AND ACETAMINOPHEN 10; 325 MG/1; MG/1
1 TABLET ORAL EVERY 6 HOURS PRN
Qty: 120 TABLET | Refills: 0 | Status: SHIPPED | OUTPATIENT
Start: 2022-03-21 | End: 2022-04-18 | Stop reason: SDUPTHER

## 2022-03-21 RX ORDER — OXYCODONE AND ACETAMINOPHEN 10; 325 MG/1; MG/1
1 TABLET ORAL EVERY 6 HOURS PRN
COMMUNITY
End: 2022-03-21

## 2022-03-21 ASSESSMENT — ENCOUNTER SYMPTOMS
COLOR CHANGE: 0
ABDOMINAL DISTENTION: 0
COUGH: 0
VOMITING: 0
CONSTIPATION: 1
SHORTNESS OF BREATH: 0
NAUSEA: 0
BACK PAIN: 1
DIARRHEA: 0
TROUBLE SWALLOWING: 0
ABDOMINAL PAIN: 0
BLOOD IN STOOL: 1
WHEEZING: 0

## 2022-03-21 NOTE — PROGRESS NOTES
Subjective:      Patient Id: Seen Amauri Mckenna at the clinic at the  [facility], for initial palliative medicine consult for symptom management, advance care planning and goals of care discussion. He was accompanied to the appointment by: Isaiah Cardona. Chief Complaint   Patient presents with    Pain     11/10 right side    Anxiety     smoking to help relieve this     Other     black tarry stools x 5 days    Shortness of Breath      HPI       Aruna Browne is a 62 y.o. male referred to palliative care by Dr. Macario Garcia for symptom management, advance care planning, and goals of care conversation. Amauri Mckenna has complex medical history that includes recurrent malignant neoplasm of lung (small cell, s/p right partial lobectomy 2019) and recurrent malignant bladder cancer, malignant melanoma s/p left eye removal (2011), COPD, bipolar, HLD, smoker, CAD s/p coronary angioplasty implant and graft, ? Stroke 2018. Home visit will be necessary in lieu of office due to significant frailty and high symptom burden from comorbid illnesses and no reliable transportation. Patient A+Ox3. With multiple complaints. Complains of 10/10 right posterior and left anterior lung pain, sharp and stabbing. Taking Percocet 5-325 1-2 tabs every 6 hours as needed for pain, reports brings level down to 7/10 which is manageable for him. Pain worse with movement. Also having complaints of bowel issues. Had green diarrhea 6 days ago, followed by black and tarry stool past 5 days, and now intermittent constipation. Has not taken anything for diarrhea or constipation. Constipation symptoms resolved with ex-lax in the past. Has not been seen by anyone for c/o melena. Pt recently moved to [address] [place] [address] from [place] and reports his appetite has improved significantly since the move. Weight previously was 158 lb, weight loss down to 123 lb while in [place]. Weight now 135 lb. Pt with history of Bipolar. Denies depressive symptoms.  Does have anxiety symptoms with disease course and takes Xanax 1 mg BID as needed with complete relief. Denies symptoms of SOB, chest pain, cough, dysphagia, nausea, vomiting, insomnia, or mood changes.     Unable to review recent Labs and imaging: *will need to request records from Dr. Arleth Arias    Past Medical History:   Diagnosis Date    Arthritis     Asthma     [facility]     CAD (coronary artery disease)     Cancer ([facility]     R lung cancer, bladder cancer 6 times     Cerebral artery occlusion with cerebral infarction ([facility]     COPD (chronic obstructive pulmonary disease) ([facility]     Depression     Essential hypertension 3/23/2019    Hepatitis C     Hyperlipidemia     Kidney stone 2015    Melanoma [facility] 2011    L eye was removed     Melanoma ([facility] 2011    L eye was removed      Past Surgical History:   Procedure Laterality Date    CORONARY ANGIOPLASTY WITH STENT PLACEMENT Left     stents placed to L carotid (2015) L arm (6/2017)    EYE SURGERY      melanoma took L eye    [facility] [address] INCL FLUOR GDNCE DX W/CELL WASHG SPX N/A 7/13/2018    BRONCHOSCOPY performed by Etta Ndiaye MD at [address] History     Socioeconomic History    Marital status: Single     Spouse name: Not on file    Number of children: Not on file    Years of education: Not on file    Highest education level: Not on file   Occupational History    Not on file   Tobacco Use    Smoking status: Current Every Day Smoker     Packs/day: 0.50     Years: 50.00     Pack years: 25.00     Types: Cigarettes    Smokeless tobacco: Never Used    Tobacco comment: Patient states he quit yesterday   Vaping Use    Vaping Use: Never used   Substance and Sexual Activity    Alcohol use: No    Drug use: No     Types: Marijuana (Weed)     Comment: repoerts using occassionally, last use 2 days ago    Sexual activity: Not on file   Other Topics Concern    Not on file   Social History Narrative    Not on file     Social Determinants of Health     Financial Resource Strain:     Difficulty of Paying Living Expenses: Not on file   Food Insecurity:     Worried About Running Out of Food in the Last Year: Not on file    Radha of Food in the Last Year: Not on file   Transportation Needs:     Lack of Transportation (Medical): Not on file    Lack of Transportation (Non-Medical): Not on file   Physical Activity:     Days of Exercise per Week: Not on file    Minutes of Exercise per Session: Not on file   Stress:     Feeling of Stress : Not on file   Social Connections:     Frequency of Communication with Friends and Family: Not on file    Frequency of Social Gatherings with Friends and Family: Not on file    Attends Yazidism Services: Not on file    Active Member of [address] [place] [address] [employer] or Organizations: Not on file    Attends Club or Organization Meetings: Not on file    Marital Status: Not on file   Intimate Partner Violence:     Fear of Current or Ex-Partner: Not on file    Emotionally Abused: Not on file    Physically Abused: Not on file    Sexually Abused: Not on file   Housing Stability:     Unable to Pay for Housing in the Last Year: Not on file    Number of [place] in the Last Year: Not on file    Unstable Housing in the Last Year: Not on file     History reviewed. No pertinent family history. No Known Allergies  Current Outpatient Medications on File Prior to Visit   Medication Sig Dispense Refill    nitroGLYCERIN (NITROSTAT) 0.4 MG SL tablet DISSOLVE 1 (ONE) TABLET UNDER THE TONGUE AS NEEDED FOR CHEST PAIN. MAY REPEAT EVERY 5 MINUTES IF NEEDED; MAX OF 3 DOSES.   IF NO RELIEF, EZRA  1    atorvastatin (LIPITOR) 20 MG tablet TAKE 1 TABLET AT BEDTIME  3    Budesonide-Formoterol Fumarate (SYMBICORT IN) Inhale into the lungs      albuterol sulfate  (90 Base) MCG/ACT inhaler Inhale 2 puffs into the lungs every 6 hours as needed for Wheezing 1 Inhaler 0    ALPRAZolam (XANAX) 1 MG tablet Take 1 mg by mouth every 12 hours as needed for Anxiety      clopidogrel (PLAVIX) 75 MG tablet Take 75 mg by mouth daily       No current facility-administered medications on file prior to visit. Review of Systems   Constitutional: Positive for activity change. Negative for appetite change and fatigue. HENT: Negative for trouble swallowing. Respiratory: Negative for cough, shortness of breath and wheezing. Cardiovascular: Negative for chest pain and leg swelling. Gastrointestinal: Positive for blood in stool and constipation. Negative for abdominal distention, abdominal pain, diarrhea, nausea and vomiting. Genitourinary: Negative for difficulty urinating. Musculoskeletal: Positive for back pain. Negative for arthralgias, gait problem, joint swelling, myalgias and neck pain. Skin: Negative for color change and wound. Neurological: Negative for dizziness, tremors, speech difficulty, weakness, light-headedness, numbness and headaches. Psychiatric/Behavioral: Negative for confusion, dysphoric mood and sleep disturbance. The patient is nervous/anxious. Objective:   /72 (Site: Left Upper Arm, Position: Sitting, Cuff Size: Medium Adult)   Pulse 115   Temp 100.9 °F (38.3 °C) (Temporal)   Resp 16   Wt 135 lb (61.2 kg)   SpO2 97%   BMI 19.94 kg/m²    Wt Readings from Last 3 Encounters:   03/21/22 135 lb (61.2 kg)   07/26/18 130 lb (59 kg)   07/13/18 130 lb (59 kg)     Physical Exam  Constitutional:       General: He is not in acute distress. HENT:      Head: Normocephalic. Eyes:      Comments: S/p left eye removal   Cardiovascular:      Rate and Rhythm: Regular rhythm. Tachycardia present. Pulses: Normal pulses. Heart sounds: Normal heart sounds. No murmur heard. Pulmonary:      Effort: Pulmonary effort is normal.      Breath sounds: Normal breath sounds. No wheezing or rhonchi. Abdominal:      General: Abdomen is flat. Bowel sounds are normal.      Palpations: Abdomen is soft. Tenderness: There is no abdominal tenderness. Genitourinary:     Comments: PILI performed- normal rectal tone, no visible mass or hemorrhoids. Brown stool noted. Musculoskeletal:         General: Normal range of motion. Cervical back: Normal range of motion. Skin:     General: Skin is warm and dry. Capillary Refill: Capillary refill takes less than 2 seconds. Neurological:      Mental Status: He is alert and oriented to person, place, and time. Mental status is at baseline. Motor: No weakness. Psychiatric:         Mood and Affect: Mood normal.         Behavior: Behavior normal.         Thought Content: Thought content normal.         Judgment: Judgment normal.         Assessment and Plan:      1. Cancer associated pain  2. Malignant neoplasm of lung, unspecified laterality, unspecified part of lung ([facility] [facility]  3. Bladder carcinoma ([facility]  4. Malignant melanoma of left eye ([facility]  - Continue oxyCODONE-acetaminophen (PERCOCET)  MG per tablet; Take 1 tablet by mouth every 6 hours as needed for Pain for up to 30 days. Intended supply: 30 days  Dispense: 120 tablet; Refill: 0  - Maintain follow up with Oncology  - Pt is tolerating current pain medications without adverse effects or over sedation. Lowest effective dose used. Pt advised to call and notify palliative care for any adverse effects or sedation  Pt is able to maintain adequate functional level and participate in ADLs  OARRS reviewed. There is no indication of aberrant behavior  Pt advised to call for increasing or uncontrolled pain. Risk vs benefit assessed. Pt educated on risk of addiction. Pt advised to take only as prescribed and not to change frequency of pain meds without consulting palliative care first.  - Notify office during normal business hours 3 days prior to prescription refills    5. Anxiety  - Maintain f/u with psychiatry  - Continue Xanax 1 mg BID as needed    6.  Tobacco dependence due to cigarettes  - Encourage cessation    7. Complaint of melena  > PILI performed, brown stool noted. No visible mass or hemorrhoids  - Seek emergency care for episodes of GIB, dizziness, pallor, tachycardia, hypotension. 8. Constipation, unspecified constipation type  - Ex-lax PRN- pt reports relief with this medication    9. Weight loss  - Currently with great appetite and slight weight gain since the move to [facility]. Will CTM  - Small frequent meals. Maximize calories and protein. Eat foods that appeal to patient's taste. Nutritional shakes. 10. Palliative care encounter  Explained the role of palliative care in treating patients. Discussed symptom management related to chronic diseases/conditions. Provided emotional support and active listening. Patient understands and is agreeable to current plan. Medications Discontinued During This Encounter   Medication Reason    OXcarbazepine (TRILEPTAL) 150 MG tablet     ipratropium-albuterol (DUONEB) 0.5-2.5 (3) MG/3ML SOLN nebulizer solution     CARTIA  MG extended release capsule     ARIPiprazole (ABILIFY) 5 MG tablet     aspirin 81 MG EC tablet     oxyCODONE-acetaminophen (PERCOCET)  MG per tablet         - Advance Care Planning   We discussed Living Will (LW), and [facility]) as well as their activation. Patient choice discussed. LW/HCPOA in place No;Tasked  for assistance with completing paperwork No; Code status discussed Yes; signed Yes. Code Full code. All related questions were answered completely. - Goals of Care Discussion:  Disease process and goals of treatment were discussed in basic terms. Sagar's goal is to optimize available comfort care measures with pain to maintain current lifestyle. We discussed the palliative care philosophy in light of those goals. We discussed all care options contingent on treatment response and QOL. Much active listening, presence, and emotional support were given.      Discussed with patient/surrogate: POC, Treatment risks/benefits, and alternatives. All questions were answered. Additionally, a printed copy of the CDC medications/opioid safety informational sheet was reviewed and given to patient for reference. Opioid contract signed:Yes    Due to acuity, symptomatology and high-risk medication management, I advised patient to Return in about 3 weeks (around 4/11/2022) for palliative care visit. Thanks for the opportunity you have allowed us to provide palliative care to Thomas Vargas. We will be in touch as care progresses. Please feel free to reach out to us should you have any questions or requests.     Total Time 60 mins (Adv. Care planning 17 mins)           JOS Jesus - CNP    Collaborating physician: Dr. Rehana Santana

## 2022-03-24 ENCOUNTER — TELEPHONE (OUTPATIENT)
Dept: PALLATIVE CARE | Age: 62
End: 2022-03-24

## 2022-03-24 NOTE — TELEPHONE ENCOUNTER
Patient is reporting he is in the hospital with dx of PNA and he is reporting that is where his pain was coming from.

## 2022-03-28 ENCOUNTER — TELEPHONE (OUTPATIENT)
Dept: PALLATIVE CARE | Age: 62
End: 2022-03-28

## 2022-03-28 NOTE — TELEPHONE ENCOUNTER
Maury Dai called to explain that he has no appetite. He is requesting a medical mariajuana card. He understands that he isn't able to smoke it, but he wants to buy \"gummies\" to help increase his appetite and also help alleviate some of his pain. I explained that you wouldn't be able to matt him that card but maybe you knew who he could be referred to? Let me know!  Thanks

## 2022-03-30 ENCOUNTER — TELEPHONE (OUTPATIENT)
Dept: PALLATIVE CARE | Age: 62
End: 2022-03-30

## 2022-03-30 NOTE — TELEPHONE ENCOUNTER
Francisne Tiffanie called this morning to explain it is \"hard to pee\". Last night he urinated \"bright red blood\" and there were \"chunks\" he said that it \"splattered on the wall\". This happened to him a few months ago while he was in [place]- he went to the ER and he said they had a catheter in him for about a month. He reports he had surgery 11-12 weeks ago. He has bladder CA, and understands that there is a tumor there which is causing this. He explains that his pain was a 10/10 he took 1 percocet and he is now 8/10 pain. Lower abd & lower back. He does take plavix 75 mg daily. I imagine he will need to go to the ER, although I told him I would reach out to you first to see if you had any other advice.

## 2022-04-01 ENCOUNTER — TELEPHONE (OUTPATIENT)
Dept: PALLATIVE CARE | Age: 62
End: 2022-04-01

## 2022-04-01 DIAGNOSIS — C34.90 MALIGNANT NEOPLASM OF LUNG, UNSPECIFIED LATERALITY, UNSPECIFIED PART OF LUNG (HCC): ICD-10-CM

## 2022-04-01 DIAGNOSIS — C67.9 BLADDER CARCINOMA (HCC): ICD-10-CM

## 2022-04-01 DIAGNOSIS — G89.3 CANCER ASSOCIATED PAIN: Primary | ICD-10-CM

## 2022-04-01 DIAGNOSIS — Z51.5 PALLIATIVE CARE ENCOUNTER: ICD-10-CM

## 2022-04-01 NOTE — TELEPHONE ENCOUNTER
Lupe Cates moved here from [place]- he is currently staying with a family member. He is unable to afford a mattress- reports they are $1000-$2000. He has contacted [facility] to see if they can help him get a bed. A worker from this organization called me and reports based off what she is getting from him it sounds like he will be hospice appropriate very soon. They are willing to buy him a bed- although, they don't want to buy a normal bed just for him to not be comfortable in a few months. She suggested possibly a hospital bed. If you place the order for a hospital bed, [facility] will pay for it until he is with hospice then hospice would take over the payments. He is currently on an air mattress, which often deflates at night. He is in a lot of pain, not wanting to take narcotics. Issues with the bladder, etc.   If you think it is appropriate, please place order for hospital bed. I will then reach back out to [facility] on Monday & [employer].

## 2022-04-01 NOTE — TELEPHONE ENCOUNTER
Ramesh Bertrand from [facility] called and needs to speak to someone about this patient. They are trying to help the patient with a mattress for his bed. Please advise and thanks!

## 2022-04-12 ENCOUNTER — TELEPHONE (OUTPATIENT)
Dept: PALLATIVE CARE | Age: 62
End: 2022-04-12

## 2022-04-12 NOTE — TELEPHONE ENCOUNTER
Maxine Stoddard called in to explain that she is POA of [employer]. She would like you to call her to explain what is going on with [employer]. Her number is [phone]. I explained you were out seeing patients and that possibly you could call her tomorrow. She seems very concerned about him and just needs further guidance/advice. Thanks!

## 2022-04-14 ENCOUNTER — SCHEDULED TELEPHONE ENCOUNTER (OUTPATIENT)
Dept: PALLATIVE CARE | Age: 62
End: 2022-04-14
Payer: MEDICAID

## 2022-04-14 DIAGNOSIS — F41.9 ANXIETY: ICD-10-CM

## 2022-04-14 DIAGNOSIS — C67.9 BLADDER CARCINOMA (HCC): ICD-10-CM

## 2022-04-14 DIAGNOSIS — C34.90 MALIGNANT NEOPLASM OF LUNG, UNSPECIFIED LATERALITY, UNSPECIFIED PART OF LUNG (HCC): ICD-10-CM

## 2022-04-14 DIAGNOSIS — Z97.8 FOLEY CATHETER IN PLACE: ICD-10-CM

## 2022-04-14 DIAGNOSIS — C69.92 MALIGNANT MELANOMA OF LEFT EYE (HCC): ICD-10-CM

## 2022-04-14 DIAGNOSIS — Z51.5 PALLIATIVE CARE ENCOUNTER: ICD-10-CM

## 2022-04-14 DIAGNOSIS — G89.3 CANCER ASSOCIATED PAIN: Primary | ICD-10-CM

## 2022-04-14 DIAGNOSIS — R63.4 WEIGHT LOSS: ICD-10-CM

## 2022-04-14 DIAGNOSIS — F17.210 TOBACCO DEPENDENCE DUE TO CIGARETTES: ICD-10-CM

## 2022-04-14 DIAGNOSIS — N32.89 BLADDER SPASMS: ICD-10-CM

## 2022-04-14 PROCEDURE — 99443 PR PHYS/QHP TELEPHONE EVALUATION 21-30 MIN: CPT | Performed by: NURSE PRACTITIONER

## 2022-04-14 RX ORDER — OXYBUTYNIN CHLORIDE 10 MG/1
10 TABLET, EXTENDED RELEASE ORAL DAILY
Qty: 30 TABLET | Refills: 3 | Status: SHIPPED | OUTPATIENT
Start: 2022-04-14 | End: 2022-06-13 | Stop reason: SINTOL

## 2022-04-14 RX ORDER — DILTIAZEM HYDROCHLORIDE 180 MG/1
180 CAPSULE, EXTENDED RELEASE ORAL DAILY
COMMUNITY
End: 2022-07-29

## 2022-04-14 ASSESSMENT — ENCOUNTER SYMPTOMS
BACK PAIN: 1
VOMITING: 0
SHORTNESS OF BREATH: 0
DIARRHEA: 0
TROUBLE SWALLOWING: 0
WHEEZING: 0
NAUSEA: 0
COUGH: 0
ABDOMINAL DISTENTION: 0
COLOR CHANGE: 0
ABDOMINAL PAIN: 0

## 2022-04-14 NOTE — PROGRESS NOTES
Subjective:      Patient Id: Seen Cena Sacks virtually, for f/u palliative medicine visit for symptom management, advance care planning and goals of care discussion. He was accompanied to the appointment by: self    Chief Complaint   Patient presents with    Follow-up    Pain    Other     bladder spasms      HPI       Helen Ruiz is a 62 y.o. male referred to palliative care by Dr. Amy Ortiz for symptom management, advance care planning, and goals of care conversation. Cena Sacks has complex medical history that includes recurrent malignant neoplasm of lung (small cell, s/p right partial lobectomy 2019) and recurrent malignant bladder cancer, malignant melanoma s/p left eye removal (2011), COPD, bipolar, HLD, smoker, CAD s/p coronary angioplasty implant and graft, ? Stroke 2018. Home visit will be necessary in lieu of office due to significant frailty and high symptom burden from comorbid illnesses and no reliable transportation. Pt A+Ox3, in NAD. Since prior visit, Pt in the hospital at [facility] for 5 days for hematuria requiring bladder irrigation and surgery (will need  records), still has catheter at home with [address] for bladder irrigation. Since discharge, urine has been yellow and clear with no intense pain. Waiting on biopsy and further plan for catheter. Pt continues to have bladder spasms, worse at night. Patient reports he is in need for a Percoet prescription, which is not due at this time. Educated patient that he is to not take prescription outside of how it is ordered, and if this occurs again I will not be able to order his opioids. Pt voices understanding. Pt reports his weight is stable ~140 pounds. He denies appetite changes, dysphagia, nausea, vomiting, changes in BMs, SOB, chest pain, or fever like symptoms. No insomnia or major mood changes. Does have ongoing anxiety d/t illness course, for which is alleviated with PRN Xanax. Pt with history of Bipolar.  Denies depressive symptoms. Does have anxiety symptoms with disease course and takes Xanax 1 mg BID as needed with complete relief.      Past Medical History:   Diagnosis Date    Arthritis     Asthma     [facility] ([facility]     CAD (coronary artery disease)     [facility]     R lung cancer, bladder cancer 6 times     Cerebral artery occlusion with cerebral infarction [facility]     COPD (chronic obstructive pulmonary disease) ([facility]     Depression     Essential hypertension 3/23/2019    Hepatitis C     Hyperlipidemia     Kidney stone 2015    Melanoma ([facility] 2011    L eye was removed     Melanoma ([facility] 2011    L eye was removed      Past Surgical History:   Procedure Laterality Date    CORONARY ANGIOPLASTY WITH STENT PLACEMENT Left     stents placed to L carotid (2015) L arm (6/2017)    EYE SURGERY      melanoma took L eye    MD [address] INCL FLUOR GDNCE DX W/CELL WASHG SPX N/A 7/13/2018    BRONCHOSCOPY performed by Dharmesh Larios MD at [address] History     Socioeconomic History    Marital status: Single     Spouse name: Not on file    Number of children: Not on file    Years of education: Not on file    Highest education level: Not on file   Occupational History    Not on file   Tobacco Use    Smoking status: Current Every Day Smoker     Packs/day: 0.50     Years: 50.00     Pack years: 25.00     Types: Cigarettes    Smokeless tobacco: Never Used    Tobacco comment: Patient states he quit yesterday   Vaping Use    Vaping Use: Never used   Substance and Sexual Activity    Alcohol use: No    Drug use: No     Types: Marijuana (Weed)     Comment: repoerts using occassionally, last use 2 days ago    Sexual activity: Not on file   Other Topics Concern    Not on file   Social History Narrative    Not on file     Social Determinants of Health     Financial Resource Strain:     Difficulty of Paying Living Expenses: Not on file   Food Insecurity:     Worried About Running Out of [address] in the Last Year: Not on file    Ran Out of Food in the Last Year: Not on file   Transportation Needs:     Lack of Transportation (Medical): Not on file    Lack of Transportation (Non-Medical): Not on file   Physical Activity:     Days of Exercise per Week: Not on file    Minutes of Exercise per Session: Not on file   Stress:     Feeling of Stress : Not on file   Social Connections:     Frequency of Communication with Friends and Family: Not on file    Frequency of Social Gatherings with Friends and Family: Not on file    Attends [employer] Services: Not on file    Active Member of [address] [place] [address] [employer] or Organizations: Not on file    Attends Club or Organization Meetings: Not on file    Marital Status: Not on file   Intimate Partner Violence:     Fear of Current or Ex-Partner: Not on file    Emotionally Abused: Not on file    Physically Abused: Not on file    Sexually Abused: Not on file   Housing Stability:     Unable to Pay for Housing in the Last Year: Not on file    Number of [place] in the Last Year: Not on file    Unstable Housing in the Last Year: Not on file     History reviewed. No pertinent family history. No Known Allergies  Current Outpatient Medications on File Prior to Visit   Medication Sig Dispense Refill    fluticasone-salmeterol (ADVAIR) 100-50 MCG/DOSE diskus inhaler Inhale 1 puff into the lungs every 12 hours      dilTIAZem (DILACOR XR) 180 MG extended release capsule Take 180 mg by mouth daily      nitroGLYCERIN (NITROSTAT) 0.4 MG SL tablet DISSOLVE 1 (ONE) TABLET UNDER THE TONGUE AS NEEDED FOR CHEST PAIN. MAY REPEAT EVERY 5 MINUTES IF NEEDED; MAX OF 3 DOSES. IF NO RELIEF, EZRA  1    atorvastatin (LIPITOR) 20 MG tablet TAKE 1 TABLET AT BEDTIME  3    ALPRAZolam (XANAX) 1 MG tablet Take 1 mg by mouth every 12 hours as needed for Anxiety      oxyCODONE-acetaminophen (PERCOCET)  MG per tablet Take 1 tablet by mouth every 6 hours as needed for Pain for up to 30 days.  Intended Cancer associated pain  2. Malignant neoplasm of lung, unspecified laterality, unspecified part of lung ([facility]  3. Bladder carcinoma ([facility]  4. Malignant melanoma of left eye ([facility]  - Continue oxyCODONE-acetaminophen (PERCOCET)  MG per tablet; Take 1 tablet by mouth every 6 hours as needed for Pain for up to 30 days. Intended supply: 30 days  Dispense: 120 tablet; Refill: 0  - Pt requesting refills at this time, not due. Educated patient that he is to not take prescription outside of how it is ordered, and if this occurs again I will not be able to order his opioids. Pt voices understanding.  - Maintain follow up with Oncology  - Pt is tolerating current pain medications without adverse effects or over sedation. Lowest effective dose used. Pt advised to call and notify palliative care for any adverse effects or sedation  Pt is able to maintain adequate functional level and participate in ADLs  OARRS reviewed. There is no indication of aberrant behavior  Pt advised to call for increasing or uncontrolled pain. Risk vs benefit assessed. Pt educated on risk of addiction. Pt advised to take only as prescribed and not to change frequency of pain meds without consulting palliative care first.    5. Anxiety  - Maintain f/u with psychiatry  - Continue Xanax 1 mg BID as needed    6. Tobacco dependence due to cigarettes  - Encourage cessation, pt declines    7. Candelario Catheter in place  8. Bladder spasms  > [facility] nurse following for bladder irrigation  - Start Ditropan XL 10 mg PO daily (ER)- will CTM for need. Explained risks of increased dizziness and drowsiness with starting this medication, as well as bladder retention, though patient does have candelario in place. Pt voices benefits outweigh risks. 9. Weight loss  - Currently with great appetite and slight weight gain since the move to [facility]. Will CTM  - Small frequent meals. Maximize calories and protein. Eat foods that appeal to patient's taste.  Nutritional brenna. 10. Palliative care encounter  Explained the role of palliative care in treating patients. Discussed symptom management related to chronic diseases/conditions. Provided emotional support and active listening. Patient understands and is agreeable to current plan. Medications Discontinued During This Encounter   Medication Reason    albuterol sulfate  (90 Base) MCG/ACT inhaler Therapy completed     Discussed with patient/surrogate: POC, Treatment risks/benefits, and alternatives. All questions were answered. Additionally, a printed copy of the CDC medications/opioid safety informational sheet was reviewed and given to patient for reference. Opioid contract signed:Yes    Due to acuity, symptomatology and high-risk medication management, I advised patient to Return in about 4 weeks (around 5/12/2022) for palliative care visit. Thanks for the opportunity you have allowed us to provide palliative care to Jeremías Mock. We will be in touch as care progresses. Please feel free to reach out to us should you have any questions or requests.     Total Time 30 minutes virtual appointment           JOS Lopez - CNP    Collaborating physician: Dr. Franca Morris

## 2022-04-15 ASSESSMENT — ENCOUNTER SYMPTOMS
CONSTIPATION: 0
BLOOD IN STOOL: 0

## 2022-04-18 DIAGNOSIS — G89.3 CANCER ASSOCIATED PAIN: ICD-10-CM

## 2022-04-18 RX ORDER — OXYCODONE AND ACETAMINOPHEN 10; 325 MG/1; MG/1
1 TABLET ORAL EVERY 6 HOURS PRN
Qty: 120 TABLET | Refills: 0 | Status: SHIPPED | OUTPATIENT
Start: 2022-04-18 | End: 2022-05-26 | Stop reason: SDUPTHER

## 2022-04-18 NOTE — PROGRESS NOTES
Rx requested:  Requested Prescriptions     Pending Prescriptions Disp Refills    oxyCODONE-acetaminophen (PERCOCET)  MG per tablet 120 tablet 0     Sig: Take 1 tablet by mouth every 6 hours as needed for Pain for up to 30 days.  Intended supply: 30 days       Last Office Visit:   Visit date not found      Last filled:  3/21/22    Next Visit Date:  Future Appointments   Date Time Provider Elizabeth Vela   5/23/2022  1:00 PM JOS Tse CNP [facility]   6/22/2022  1:00 PM JOS Tse CNP [facility]

## 2022-04-26 ENCOUNTER — TELEPHONE (OUTPATIENT)
Dept: PALLATIVE CARE | Age: 62
End: 2022-04-26

## 2022-04-26 NOTE — TELEPHONE ENCOUNTER
Patient called stating he took his oxybutynin for his \"bladder spasms\" and he is feeling nausea and like he is drunk. He wants to know if he can stop the medication all together.

## 2022-04-26 NOTE — TELEPHONE ENCOUNTER
----- Message from JOS Stuart CNP sent at 4/26/2022 11:18 AM EDT -----  Yes patient can stop medication.

## 2022-04-28 ENCOUNTER — HOSPITAL ENCOUNTER (OUTPATIENT)
Dept: CT IMAGING | Age: 62
Discharge: HOME | End: 2022-04-30
Payer: MEDICAID

## 2022-04-28 DIAGNOSIS — C34.11 CANCER OF BRONCHUS OF RIGHT UPPER LOBE (HCC): ICD-10-CM

## 2022-04-28 DIAGNOSIS — C67.9 MALIGNANT NEOPLASM OF URINARY BLADDER, UNSPECIFIED SITE (HCC): ICD-10-CM

## 2022-04-28 PROCEDURE — 78815 PET IMAGE W/CT SKULL-THIGH: CPT

## 2022-04-28 PROCEDURE — 3430000000 HC RX DIAGNOSTIC RADIOPHARMACEUTICAL: Performed by: INTERNAL MEDICINE

## 2022-04-28 PROCEDURE — A9552 F18 FDG: HCPCS | Performed by: INTERNAL MEDICINE

## 2022-04-28 RX ORDER — FLUDEOXYGLUCOSE F 18 200 MCI/ML
16.2 INJECTION, SOLUTION INTRAVENOUS
Status: COMPLETED | OUTPATIENT
Start: 2022-04-28 | End: 2022-04-28

## 2022-04-28 RX ADMIN — FLUDEOXYGLUCOSE F 18 16.2 MILLICURIE: 200 INJECTION, SOLUTION INTRAVENOUS at 10:56

## 2022-05-10 ENCOUNTER — TELEPHONE (OUTPATIENT)
Dept: PALLATIVE CARE | Age: 62
End: 2022-05-10

## 2022-05-10 DIAGNOSIS — G89.3 CANCER ASSOCIATED PAIN: Primary | ICD-10-CM

## 2022-05-10 RX ORDER — NALOXONE HYDROCHLORIDE 4 MG/.1ML
1 SPRAY NASAL PRN
Qty: 1 EACH | Refills: 5 | Status: ON HOLD | OUTPATIENT
Start: 2022-05-10 | End: 2022-07-29 | Stop reason: HOSPADM

## 2022-05-10 RX ORDER — OXYCODONE HCL 10 MG/1
10 TABLET, FILM COATED, EXTENDED RELEASE ORAL 2 TIMES DAILY
Qty: 60 TABLET | Refills: 0 | Status: SHIPPED | OUTPATIENT
Start: 2022-05-10 | End: 2022-06-09

## 2022-05-10 NOTE — TELEPHONE ENCOUNTER
Pt called the office today with complaints of 10/10 pain and running out of his pain medication 8 days prior to the ordered ending date. Pt has also been hospitalizaed multiple times and treated with IV pain medication while admitted. This indicates that the patient is taking the medication more than prescribed. This is the second occurrence of this issue happening. Pt is in tears, at Dr. Everett Sun office. I explained to patient that I will increase the frequency of his immediate release medication during his next scheduled refill on May 18, and I will start him on an extended release medication today for cancer associated pain. I explained to the patient that if this situation happens one more time with either medication, he will be discharge from the program for not taking his opioids as prescribed. Pt voices understanding.     Sonia Barrios, JOS - CNP

## 2022-05-10 NOTE — TELEPHONE ENCOUNTER
Tiara Lane called in to explain he is out of pain medication. He has had multiple catheters placed and has been in and out of the hospital. He is \"in tears\" with the \"extreme pain\" and wants to know if there is anything else we can do to help him. He is on his way to the oncologist now.

## 2022-05-18 ENCOUNTER — TELEPHONE (OUTPATIENT)
Dept: PALLATIVE CARE | Age: 62
End: 2022-05-18

## 2022-05-18 DIAGNOSIS — G89.3 CANCER ASSOCIATED PAIN: ICD-10-CM

## 2022-05-18 RX ORDER — OXYCODONE AND ACETAMINOPHEN 10; 325 MG/1; MG/1
1 TABLET ORAL EVERY 6 HOURS PRN
Qty: 120 TABLET | Refills: 0 | Status: CANCELLED | OUTPATIENT
Start: 2022-05-18 | End: 2022-09-15

## 2022-05-18 NOTE — TELEPHONE ENCOUNTER
Roseline Diaz called in this morning to explain that the NH, [facility] is only giving him the percocet every 6 hours as needed. He said it works for about 4 hours, then he is in pain for 2 hours, and it takes about 45 mins to take effect. He keeps saying you meant to order him 180 pills but did 120 pills? He now wants to leave the nursing home, go home and have you give him the 180 pills and have Mariam Navarro and us see him at home. I am quite confused by this- did you tell him you would change it to every 4 hours or something?  Either way he is trying to go home so I think maybe you should call him if this isn't your plan? [phone]

## 2022-05-18 NOTE — PROGRESS NOTES
Lesviajulius Holstein called to report that he is leaving [address] [place] [address] and he will need Percocet. The oxycontin he cannot take because it makes him sick. Rx requested:  Requested Prescriptions     Pending Prescriptions Disp Refills    oxyCODONE-acetaminophen (PERCOCET)  MG per tablet 120 tablet 0     Sig: Take 1 tablet by mouth every 6 hours as needed for Pain for up to 120 days. Intended supply: 30 days       Last Office Visit:   Visit date not found      Last filled:  4/18/22    Next Visit Date:  No future appointments.

## 2022-05-19 ENCOUNTER — TELEPHONE (OUTPATIENT)
Dept: PALLATIVE CARE | Age: 62
End: 2022-05-19

## 2022-05-19 NOTE — TELEPHONE ENCOUNTER
Vonna Gosselin called in this morning to explain he is leaving the nursing home at 1800, he is requesting you order him 180 percocet today. He explains that he meets with the surgeon next week to take out his bladder and he will need the 180 pills to get him through this then go back to the 120 pills.

## 2022-05-26 DIAGNOSIS — G89.3 CANCER ASSOCIATED PAIN: ICD-10-CM

## 2022-05-26 RX ORDER — OXYCODONE AND ACETAMINOPHEN 10; 325 MG/1; MG/1
1 TABLET ORAL EVERY 4 HOURS PRN
Qty: 180 TABLET | Refills: 0 | Status: SHIPPED | OUTPATIENT
Start: 2022-05-26 | End: 2022-06-27 | Stop reason: SDUPTHER

## 2022-05-26 RX ORDER — NALOXONE HYDROCHLORIDE 4 MG/.1ML
1 SPRAY NASAL PRN
Qty: 1 EACH | Refills: 5 | Status: ON HOLD | OUTPATIENT
Start: 2022-05-26 | End: 2022-07-29 | Stop reason: HOSPADM

## 2022-05-26 NOTE — PROGRESS NOTES
180 pills? Rx requested:  Requested Prescriptions     Pending Prescriptions Disp Refills    oxyCODONE-acetaminophen (PERCOCET)  MG per tablet 120 tablet 0     Sig: Take 1 tablet by mouth every 6 hours as needed for Pain for up to 120 days. Intended supply: 30 days       Last Office Visit:   Visit date not found      Last filled:  4/18/22    Next Visit Date:  No future appointments.

## 2022-06-06 ENCOUNTER — OFFICE VISIT (OUTPATIENT)
Dept: CARDIOTHORACIC SURGERY | Age: 62
End: 2022-06-06
Payer: MEDICARE

## 2022-06-06 VITALS — WEIGHT: 135 LBS | OXYGEN SATURATION: 96 % | HEART RATE: 110 BPM | BODY MASS INDEX: 19.99 KG/M2 | HEIGHT: 69 IN

## 2022-06-06 DIAGNOSIS — C34.90 MALIGNANT NEOPLASM OF LUNG, UNSPECIFIED LATERALITY, UNSPECIFIED PART OF LUNG (HCC): Primary | ICD-10-CM

## 2022-06-06 DIAGNOSIS — J98.4 CHRONIC LUNG DISEASE: Primary | ICD-10-CM

## 2022-06-06 DIAGNOSIS — R91.8 LUNG MASS: Primary | ICD-10-CM

## 2022-06-06 DIAGNOSIS — T82.898A CAROTID STENT OCCLUSION, INITIAL ENCOUNTER (HCC): ICD-10-CM

## 2022-06-06 PROCEDURE — 99214 OFFICE O/P EST MOD 30 MIN: CPT | Performed by: THORACIC SURGERY (CARDIOTHORACIC VASCULAR SURGERY)

## 2022-06-06 PROCEDURE — 4004F PT TOBACCO SCREEN RCVD TLK: CPT | Performed by: THORACIC SURGERY (CARDIOTHORACIC VASCULAR SURGERY)

## 2022-06-06 PROCEDURE — G8427 DOCREV CUR MEDS BY ELIG CLIN: HCPCS | Performed by: THORACIC SURGERY (CARDIOTHORACIC VASCULAR SURGERY)

## 2022-06-06 PROCEDURE — G8420 CALC BMI NORM PARAMETERS: HCPCS | Performed by: THORACIC SURGERY (CARDIOTHORACIC VASCULAR SURGERY)

## 2022-06-06 PROCEDURE — 3017F COLORECTAL CA SCREEN DOC REV: CPT | Performed by: THORACIC SURGERY (CARDIOTHORACIC VASCULAR SURGERY)

## 2022-06-06 ASSESSMENT — ENCOUNTER SYMPTOMS
DIARRHEA: 0
ABDOMINAL PAIN: 0
VOICE CHANGE: 0
WHEEZING: 0
ABDOMINAL DISTENTION: 0
SORE THROAT: 0
NAUSEA: 0
TROUBLE SWALLOWING: 0
COUGH: 0
STRIDOR: 0
CHOKING: 0
CHEST TIGHTNESS: 0
SHORTNESS OF BREATH: 0
VOMITING: 0

## 2022-06-06 NOTE — PROGRESS NOTES
Subjective:      Patient ID: Mendoza Salcedo is a 62 y.o. male who presents today for:  Chief Complaint   Patient presents with    Pulmonary Nodule       HPI  Patient is 2 years and 7 months out from a right upper lobectomy for a [id number] adenocarcinoma. After the surgery he moved to [place]. Per the patient he developed a recurrence possibly in his mediastinum. He says he underwent chemotherapy and radiation in [place] and has been disease-free. Unfortunate he developed what sound like a very aggressive bladder cancer. After several interventions in [place] he was convinced to move back to our area to be near family. His bladder cancer has recurred and they are planning major pelvic surgery on June 24. During all of this a CAT scan was obtained which showed a new mass in the superior segment of the left lung. Follow-up PET scan showed hyperactivity suspicious for malignancy but no disease anywhere else in the chest.  Referral was made for surgical evaluation. Other than the bladder issue the patient is in his usual state of health. He gets mildly short of breath after walking several blocks but is not on oxygen and can climb stairs. He denies any change in cough or hemoptysis or unexpected weight loss. Unfortunately he is still smoking. Because of multiple bleeding episodes from the bladder cancer his anticoagulation was stopped for his carotid stent. He has not followed up with cardiology.     Past Medical History:   Diagnosis Date    Arthritis     Asthma     Bipolar disorder ([address]     CAD (coronary artery disease)     Cancer ([address]     R lung cancer, bladder cancer 6 times     Cerebral artery occlusion with cerebral infarction ([address]     COPD (chronic obstructive pulmonary disease) ([address]     Depression     Essential hypertension 3/23/2019    Hepatitis C     Hyperlipidemia     Kidney stone 2015    Melanoma ([address] 2011    L eye was removed     Melanoma ([address] 2011    L eye was removed       Past Surgical History:   Procedure Laterality Date    CORONARY ANGIOPLASTY WITH STENT PLACEMENT Left     stents placed to L carotid (2015) L arm (6/2017)    EYE SURGERY      melanoma took L eye    [address] INCL FLUOR GDNCE DX W/CELL WASHG SPX N/A 7/13/2018    BRONCHOSCOPY performed by Roseline Cedillo MD at [address] History     Socioeconomic History    Marital status: Single     Spouse name: Not on file    Number of children: Not on file    Years of education: Not on file    Highest education level: Not on file   Occupational History    Not on file   Tobacco Use    Smoking status: Current Every Day Smoker     Packs/day: 0.50     Years: 50.00     Pack years: 25.00     Types: Cigarettes    Smokeless tobacco: Never Used    Tobacco comment: Patient states he quit yesterday   Vaping Use    Vaping Use: Never used   Substance and Sexual Activity    Alcohol use: No    Drug use: No     Types: Marijuana (Weed)     Comment: repoerts using occassionally, last use 2 days ago    Sexual activity: Not on file   Other Topics Concern    Not on file   Social History Narrative    Not on file     Social Determinants of Health     Financial Resource Strain:     Difficulty of Paying Living Expenses: Not on file   Food Insecurity:     Worried About [address] in the Last Year: Not on file    [address] in the Last Year: Not on file   Transportation Needs:     Lack of Transportation (Medical): Not on file    Lack of Transportation (Non-Medical):  Not on file   Physical Activity:     Days of Exercise per Week: Not on file    Minutes of Exercise per Session: Not on file   Stress:     Feeling of Stress : Not on file   Social Connections:     Frequency of Communication with Friends and Family: Not on file    Frequency of Social Gatherings with Friends and Family: Not on file    Attends Faith Services: Not on file    Active Member of Clubs or Organizations: Not on file   [facility] Attends Club or Organization Meetings: Not on file    Marital Status: Not on file   Intimate Partner Violence:     Fear of Current or Ex-Partner: Not on file    Emotionally Abused: Not on file    Physically Abused: Not on file    Sexually Abused: Not on file   Housing Stability:     Unable to Pay for Housing in the Last Year: Not on file    Number of [place] in the Last Year: Not on file    Unstable Housing in the Last Year: Not on file     No family history on file. No Known Allergies  Current Outpatient Medications on File Prior to Visit   Medication Sig Dispense Refill    oxyCODONE-acetaminophen (PERCOCET)  MG per tablet Take 1 tablet by mouth every 4 hours as needed for Pain for up to 30 days. Intended supply: 30 days 180 tablet 0    naloxone 4 MG/0.1ML LIQD nasal spray 1 spray by Nasal route as needed for Opioid Reversal 1 each 5    oxyCODONE (OXYCONTIN) 10 MG extended release tablet Take 1 tablet by mouth 2 times daily for 30 days. Intended supply: 30 days 60 tablet 0    naloxone 4 MG/0.1ML LIQD nasal spray 1 spray by Nasal route as needed for Opioid Reversal 1 each 5    fluticasone-salmeterol (ADVAIR) 100-50 MCG/DOSE diskus inhaler Inhale 1 puff into the lungs every 12 hours      dilTIAZem (DILACOR XR) 180 MG extended release capsule Take 180 mg by mouth daily      oxybutynin (DITROPAN XL) 10 MG extended release tablet Take 1 tablet by mouth daily 30 tablet 3    nitroGLYCERIN (NITROSTAT) 0.4 MG SL tablet DISSOLVE 1 (ONE) TABLET UNDER THE TONGUE AS NEEDED FOR CHEST PAIN. MAY REPEAT EVERY 5 MINUTES IF NEEDED; MAX OF 3 DOSES.   IF NO RELIEF, EZRA  1    atorvastatin (LIPITOR) 20 MG tablet TAKE 1 TABLET AT BEDTIME  3    Budesonide-Formoterol Fumarate (SYMBICORT IN) Inhale into the lungs      ALPRAZolam (XANAX) 1 MG tablet Take 1 mg by mouth every 12 hours as needed for Anxiety      clopidogrel (PLAVIX) 75 MG tablet Take 75 mg by mouth daily (Patient not taking: Reported on 4/14/2022) No current facility-administered medications on file prior to visit. Review of Systems   Constitutional: Negative for activity change, appetite change, chills, diaphoresis, fatigue, fever and unexpected weight change. HENT: Negative for sore throat, trouble swallowing and voice change. Eyes: Negative for visual disturbance. Respiratory: Negative for cough, choking, chest tightness, shortness of breath, wheezing and stridor. Cardiovascular: Negative for chest pain, palpitations and leg swelling. Gastrointestinal: Negative for abdominal distention, abdominal pain, diarrhea, nausea and vomiting. Genitourinary: Positive for dysuria and hematuria. Negative for difficulty urinating. Musculoskeletal: Negative for gait problem and joint swelling. Skin: Negative for rash and wound. Neurological: Negative for dizziness, seizures and light-headedness. Hematological: Negative for adenopathy. Does not bruise/bleed easily. Psychiatric/Behavioral: Negative for behavioral problems and confusion. Objective:   Pulse (!) 110   Ht 5' 9\" (1.753 m)   Wt 135 lb (61.2 kg)   SpO2 96%   BMI 19.94 kg/m²     Physical Exam  Constitutional:       General: He is not in acute distress. Appearance: He is not ill-appearing, toxic-appearing or diaphoretic. HENT:      Head: Normocephalic and atraumatic. Nose: Nose normal.   Eyes:      Extraocular Movements: Extraocular movements intact. Conjunctiva/sclera: Conjunctivae normal.      Pupils: Pupils are equal, round, and reactive to light. Neck:      Vascular: No carotid bruit. Cardiovascular:      Rate and Rhythm: Normal rate and regular rhythm. Heart sounds: Normal heart sounds. No murmur heard. No gallop. Pulmonary:      Effort: Pulmonary effort is normal. No respiratory distress. Breath sounds: Normal breath sounds. No wheezing or rales. Abdominal:      General: Abdomen is flat.  Bowel sounds are normal. Palpations: Abdomen is soft. There is no mass. Tenderness: There is no abdominal tenderness. Musculoskeletal:         General: No swelling. Cervical back: Neck supple. Right lower leg: No edema. Left lower leg: No edema. Lymphadenopathy:      Cervical: No cervical adenopathy. Skin:     General: Skin is warm and dry. Neurological:      General: No focal deficit present. Mental Status: He is alert and oriented to person, place, and time. Psychiatric:         Mood and Affect: Mood normal.         Behavior: Behavior normal.         Thought Content: Thought content normal.         Judgment: Judgment normal.               Radiographs and Laboratory Studies:     Diagnostic Imaging Studies:    I personally viewed his CAT scan and his follow-up PET scan. There is a irregular nodule in the medial aspect of the superior segment of the left lower lobe. There is hyperactivity suspicious for malignancy but no significant activity anywhere else. Assessment/Plan     Hyperactive left lung lesion most likely lung cancer. I have gone over treatment options with the patient and recommended left VATS wedge resection only. The timing of this will have to work around the resection of his bladder cancer. The patient was told that the bladder cancer is extremely aggressive. It sounds like this procedure needs to take precedent over the wedge resection. We spoke in detail about all of our options including radiation. The patient would like to have the lung mass resected if possible. I feel the best course is to then proceed with the bladder cancer surgery first.  About 2 weeks after that surgery he should call the office. If he feels he is strong enough, then we can proceed with a wedge resection.   If he is still not strong enough to proceed with another surgery then I would have him see radiation oncology for targeted radiation so that we do not give this left lung mass too long to grow.    Carotid stent formally on Plavix that has been stopped due to the hematuria. The patient has not followed up with any vascular physician who can comment on this. He wished to switch to [facility] cardiology. We will refer him to our cardiologist to follow his carotid stent. Diagnosis Orders   1. Lung mass       No orders of the defined types were placed in this encounter. No orders of the defined types were placed in this encounter. Return if symptoms worsen or fail to improve.       Zeb Browne MD

## 2022-06-06 NOTE — PATIENT INSTRUCTIONS
Patient will proceed with bladder cancer surgery. He should call the office 2 weeks after his surgery to let us know if he is strong enough to consider lung surgery or whether he needs to be referred to radiation oncology.

## 2022-06-07 ENCOUNTER — TELEPHONE (OUTPATIENT)
Dept: PALLATIVE CARE | Age: 62
End: 2022-06-07
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
## 2022-06-09 ENCOUNTER — TELEPHONE (OUTPATIENT)
Dept: PALLATIVE CARE | Age: 62
End: 2022-06-09

## 2022-06-09 NOTE — TELEPHONE ENCOUNTER
Tracy Lawrence- You are scheduled to see Aron Lopez on Monday, June 13th at 1530. He is having his bladder removed on 6/22 therefore he has 6 pre-op appointments next week. On Monday he has a [facility] appointment at [phone]. He wants to know if you could see him any other day before 6/22 or if you guys could do a virtual appt on Monday at 1530. I explained that you have no other openings and that I would talk with you about the virtual appointment. I don't know how long his recovery will be and I know you don't have any availably to go see him at the SNF when he is healing.

## 2022-06-13 ENCOUNTER — OFFICE VISIT (OUTPATIENT)
Dept: PALLATIVE CARE | Age: 62
End: 2022-06-13
Payer: MEDICARE

## 2022-06-13 DIAGNOSIS — Z51.5 PALLIATIVE CARE ENCOUNTER: ICD-10-CM

## 2022-06-13 DIAGNOSIS — F17.210 TOBACCO DEPENDENCE DUE TO CIGARETTES: ICD-10-CM

## 2022-06-13 DIAGNOSIS — C34.90 MALIGNANT NEOPLASM OF LUNG, UNSPECIFIED LATERALITY, UNSPECIFIED PART OF LUNG (HCC): ICD-10-CM

## 2022-06-13 DIAGNOSIS — G89.3 CANCER ASSOCIATED PAIN: Primary | ICD-10-CM

## 2022-06-13 DIAGNOSIS — F41.9 ANXIETY: ICD-10-CM

## 2022-06-13 DIAGNOSIS — C67.9 BLADDER CARCINOMA (HCC): ICD-10-CM

## 2022-06-13 DIAGNOSIS — C69.92 MALIGNANT MELANOMA OF LEFT EYE (HCC): ICD-10-CM

## 2022-06-13 DIAGNOSIS — K59.00 CONSTIPATION, UNSPECIFIED CONSTIPATION TYPE: ICD-10-CM

## 2022-06-13 PROCEDURE — 99443 PR PHYS/QHP TELEPHONE EVALUATION 21-30 MIN: CPT | Performed by: NURSE PRACTITIONER

## 2022-06-13 ASSESSMENT — ENCOUNTER SYMPTOMS
DIARRHEA: 0
COLOR CHANGE: 0
COUGH: 0
NAUSEA: 0
ABDOMINAL PAIN: 0
ABDOMINAL DISTENTION: 0
CONSTIPATION: 1
VOMITING: 0
BLOOD IN STOOL: 0
SHORTNESS OF BREATH: 0
BACK PAIN: 1
TROUBLE SWALLOWING: 0
WHEEZING: 0

## 2022-06-13 NOTE — PROGRESS NOTES
Subjective:      Patient Id: Seen Jamee Hamilton virtually, for f/u palliative medicine visit for symptom management, advance care planning and goals of care discussion. He consents to being seen virtually. He was accompanied to the appointment by: self    Chief Complaint   Patient presents with    Follow-up    Pain      HPI       Latrell Jimenez is a 62 y.o. male referred to palliative care by Dr. Jero Espinoza for symptom management, advance care planning, and goals of care conversation. Jamee Hamilton has complex medical history that includes recurrent malignant neoplasm of lung (small cell, s/p right partial lobectomy 2019) and recurrent malignant bladder cancer, malignant melanoma s/p left eye removal (2011), COPD, bipolar, HLD, smoker, CAD s/p coronary angioplasty implant and graft, ? Stroke 2018. Pt A+Ox3, in NAD. Pt has had multiple ED encounters since prior visit. He primarily goes to [facility], so I am unable to review any encounters. He has also had multiple telephone encounters with myself in regard to exacerbations of pain and misuse of prescribed opioids (5/10/22) with notice that if he does this one more time he would be discharged from our program.       Pt has a lot going on in his life and with his medical conditions. He sees multiple providers: Dr. Christine Denae PCP, Dr. Jon Gracia with urology, Dr. Dayami Villeda with pulm. He told me his most updated plan is: Bladder surgery at [facility] [place] with plan for lung mass surgery at some point after. He has had recent issues with kidney stones, urinating clots, candelario cath insertion. Candelario has been out and kidney stones are gone. He currently complains of 6/10 pain right posterior and left anterior lung field, sharp and stabbing, and intermittent pelvic pain (also sharp and stabbing). Taking Percocet  1 tab q 4 hours PRN for pain with relief. Pt asked if once the cancer is cleared if we will continue to follow him for pain management.  I explained to patient that we will continue to follow him, will titrate down on his medications, but will refer him to chronic pain management if pain is still an issue. He has recent exacerbation of constipation. I have discussed multiple regimens in the past. Patient is a bit disorganized with medications in general. He is currently taking ex lax every morning, sometimes Miralax, sometimes a stool softener. Right now patient reports normal BMs. Pt reports his weight is stable ~140 pounds. He denies appetite changes, dysphagia, nausea, vomiting, changes in BMs, SOB, chest pain, or fever like symptoms. No insomnia or major mood changes. Does have ongoing anxiety d/t illness course, for which is alleviated with PRN Xanax. Pt with history of Bipolar. Denies depressive symptoms. Does have anxiety symptoms with disease course and takes Xanax 1 mg BID as needed with complete relief.      Past Medical History:   Diagnosis Date    Arthritis     Asthma     Bipolar disorder ([address]     CAD (coronary artery disease)     Cancer ([address]     R lung cancer, bladder cancer 6 times     Cerebral artery occlusion with cerebral infarction ([address]     COPD (chronic obstructive pulmonary disease) ([address]     Depression     Essential hypertension 3/23/2019    Hepatitis C     Hyperlipidemia     Kidney stone 2015    Melanoma ([address] 2011    L eye was removed     Melanoma ([address] 2011    L eye was removed      Past Surgical History:   Procedure Laterality Date    CORONARY ANGIOPLASTY WITH STENT PLACEMENT Left     stents placed to L carotid (2015) L arm (6/2017)    EYE SURGERY      melanoma took L eye    [facility] INCL FLUOR GDNCE DX W/CELL WASHG SPX N/A 7/13/2018    BRONCHOSCOPY performed by Sophia Quiros MD at [address] History     Socioeconomic History    Marital status: Single     Spouse name: Not on file    Number of children: Not on file    Years of education: Not on file    Highest education level: Not on file Occupational History    Not on file   Tobacco Use    Smoking status: Current Every Day Smoker     Packs/day: 0.50     Years: 50.00     Pack years: 25.00     Types: Cigarettes    Smokeless tobacco: Never Used    Tobacco comment: Patient states he quit yesterday   Vaping Use    Vaping Use: Never used   Substance and Sexual Activity    Alcohol use: No    Drug use: No     Types: Marijuana (Weed)     Comment: repoerts using occassionally, last use 2 days ago    Sexual activity: Not on file   Other Topics Concern    Not on file   Social History Narrative    Not on file     Social Determinants of Health     Financial Resource Strain:     Difficulty of Paying Living Expenses: Not on file   Food Insecurity:     Worried About Running Out of Food in the Last Year: Not on file    Radha of Food in the Last Year: Not on file   Transportation Needs:     Lack of Transportation (Medical): Not on file    Lack of Transportation (Non-Medical): Not on file   Physical Activity:     Days of Exercise per Week: Not on file    Minutes of Exercise per Session: Not on file   Stress:     Feeling of Stress : Not on file   Social Connections:     Frequency of Communication with Friends and Family: Not on file    Frequency of Social Gatherings with Friends and Family: Not on file    Attends Caoda[employer] Services: Not on file    Active Member of [address] [place] [address] [employer] or Organizations: Not on file    Attends Club or Organization Meetings: Not on file    Marital Status: Not on file   Intimate Partner Violence:     Fear of Current or Ex-Partner: Not on file    Emotionally Abused: Not on file    Physically Abused: Not on file    Sexually Abused: Not on file   Housing Stability:     Unable to Pay for Housing in the Last Year: Not on file    Number of [place] in the Last Year: Not on file    Unstable Housing in the Last Year: Not on file     History reviewed. No pertinent family history.   No Known Allergies  Current Outpatient Medications on File Prior to Visit   Medication Sig Dispense Refill    oxyCODONE-acetaminophen (PERCOCET)  MG per tablet Take 1 tablet by mouth every 4 hours as needed for Pain for up to 30 days. Intended supply: 30 days 180 tablet 0    fluticasone-salmeterol (ADVAIR) 100-50 MCG/DOSE diskus inhaler Inhale 1 puff into the lungs every 12 hours      dilTIAZem (DILACOR XR) 180 MG extended release capsule Take 180 mg by mouth daily      atorvastatin (LIPITOR) 20 MG tablet TAKE 1 TABLET AT BEDTIME  3    Budesonide-Formoterol Fumarate (SYMBICORT IN) Inhale into the lungs      ALPRAZolam (XANAX) 1 MG tablet Take 1 mg by mouth every 12 hours as needed for Anxiety      naloxone 4 MG/0.1ML LIQD nasal spray 1 spray by Nasal route as needed for Opioid Reversal (Patient not taking: Reported on 6/13/2022) 1 each 5    naloxone 4 MG/0.1ML LIQD nasal spray 1 spray by Nasal route as needed for Opioid Reversal 1 each 5    nitroGLYCERIN (NITROSTAT) 0.4 MG SL tablet DISSOLVE 1 (ONE) TABLET UNDER THE TONGUE AS NEEDED FOR CHEST PAIN. MAY REPEAT EVERY 5 MINUTES IF NEEDED; MAX OF 3 DOSES. IF NO RELIEF, EZRA (Patient not taking: Reported on 6/13/2022)  1     No current facility-administered medications on file prior to visit. Review of Systems   Constitutional: Positive for activity change. Negative for appetite change, fatigue and unexpected weight change. HENT: Negative for trouble swallowing. Respiratory: Negative for cough, shortness of breath and wheezing. Cardiovascular: Negative for chest pain and leg swelling. Gastrointestinal: Positive for constipation (fluctuations). Negative for abdominal distention, abdominal pain, blood in stool, diarrhea, nausea and vomiting. Genitourinary: Negative for difficulty urinating. Musculoskeletal: Positive for back pain. Negative for arthralgias, gait problem, joint swelling, myalgias and neck pain. Skin: Negative for color change and wound.    Neurological: Negative for dizziness, tremors, speech difficulty, weakness, light-headedness, numbness and headaches. Psychiatric/Behavioral: Negative for confusion, dysphoric mood and sleep disturbance. The patient is nervous/anxious. Objective: There were no vitals taken for this visit. Wt Readings from Last 3 Encounters:   06/06/22 135 lb (61.2 kg)   03/21/22 135 lb (61.2 kg)   07/26/18 130 lb (59 kg)     Physical Exam  Constitutional:       General: He is not in acute distress. HENT:      Head: Normocephalic. Neurological:      Mental Status: He is alert and oriented to person, place, and time. Mental status is at baseline. Motor: No weakness. Psychiatric:         Mood and Affect: Mood normal.         Behavior: Behavior normal.     limited d/t virtual visit    Assessment and Plan:      1. Cancer associated pain  2. Malignant neoplasm of lung, unspecified laterality, unspecified part of lung ([facility]  3. Bladder carcinoma ([facility]  4. Malignant melanoma of left eye ([facility])   Stable on current regimen. Has had one episode of misuse with Percocet. I have explained to him in extensive detail that one more issue with this, he will be discharged from program, as he has signed a pain contract explaining this. Pt asking if we will continue to follow him post cancer treatments for pain management. I explained to him that goal is to wean him off opioids post cancer treatment. If he continues to have pain we will refer him to chronic pain management. Pt with multiple ED encounters to [facility] for pain. Unfortunately I am unable to view this in Epic or care everywhere. - Continue oxyCODONE-acetaminophen (PERCOCET)  MG per tablet; Take 1 tablet by mouth every 4 hours as needed for Pain for up to 30 days  - Maintain follow up with Oncology  - Pt is tolerating current pain medications without adverse effects or over sedation. Lowest effective dose used.  Pt advised to call and notify palliative care for any adverse effects or sedation  Pt is able to maintain adequate functional level and participate in ADLs  OARRS reviewed. There is no indication of aberrant behavior  Pt advised to call for increasing or uncontrolled pain. Risk vs benefit assessed. Pt educated on risk of addiction. Pt advised to take only as prescribed and not to change frequency of pain meds without consulting palliative care first.    5. Anxiety  - Maintain f/u with psychiatry  - Continue Xanax 1 mg BID as needed, prescribed by another provider    6. Tobacco dependence due to cigarettes  - Encourage cessation, pt declines    7. Constipation  Patient managing based on how he feels it helps with his symptoms. Will CTM. 8. Palliative care encounter  Explained the role of palliative care in treating patients. Discussed symptom management related to chronic diseases/conditions. Provided emotional support and active listening. Patient understands and is agreeable to current plan. Medications Discontinued During This Encounter   Medication Reason    oxybutynin (DITROPAN XL) 10 MG extended release tablet Side effects    clopidogrel (PLAVIX) 75 MG tablet DISCONTINUED BY ANOTHER CLINICIAN     Discussed with patient/surrogate: POC, Treatment risks/benefits, and alternatives. All questions were answered. Additionally, a printed copy of the CDC medications/opioid safety informational sheet was reviewed and given to patient for reference. Opioid contract signed:Yes    Due to acuity, symptomatology and high-risk medication management, I advised patient to Return in about 8 weeks (around 8/8/2022) for palliative care visit. Thanks for the opportunity you have allowed us to provide palliative care to Christie Abdi. We will be in touch as care progresses. Please feel free to reach out to us should you have any questions or requests.     Total Time 30 minutes virtual appointment           JOS Vásquez - CNP    Collaborating physician: Dr. Gilmer Benton

## 2022-06-23 ENCOUNTER — OFFICE VISIT (OUTPATIENT)
Dept: PULMONOLOGY | Age: 62
End: 2022-06-23
Payer: MEDICAID

## 2022-06-23 VITALS
SYSTOLIC BLOOD PRESSURE: 129 MMHG | BODY MASS INDEX: 18.99 KG/M2 | OXYGEN SATURATION: 99 % | RESPIRATION RATE: 16 BRPM | DIASTOLIC BLOOD PRESSURE: 73 MMHG | HEART RATE: 84 BPM | TEMPERATURE: 97.2 F | HEIGHT: 69 IN | WEIGHT: 128.2 LBS

## 2022-06-23 DIAGNOSIS — C34.90 MALIGNANT NEOPLASM OF LUNG, UNSPECIFIED LATERALITY, UNSPECIFIED PART OF LUNG (HCC): ICD-10-CM

## 2022-06-23 DIAGNOSIS — F17.210 TOBACCO DEPENDENCE DUE TO CIGARETTES: ICD-10-CM

## 2022-06-23 DIAGNOSIS — R09.81 NOSE CONGESTION: ICD-10-CM

## 2022-06-23 DIAGNOSIS — R91.1 LUNG NODULE: ICD-10-CM

## 2022-06-23 DIAGNOSIS — J41.0 SIMPLE CHRONIC BRONCHITIS (HCC): ICD-10-CM

## 2022-06-23 DIAGNOSIS — J43.9 PULMONARY EMPHYSEMA, UNSPECIFIED EMPHYSEMA TYPE (HCC): Primary | ICD-10-CM

## 2022-06-23 PROCEDURE — 3023F SPIROM DOC REV: CPT | Performed by: INTERNAL MEDICINE

## 2022-06-23 PROCEDURE — 4004F PT TOBACCO SCREEN RCVD TLK: CPT | Performed by: INTERNAL MEDICINE

## 2022-06-23 PROCEDURE — G8427 DOCREV CUR MEDS BY ELIG CLIN: HCPCS | Performed by: INTERNAL MEDICINE

## 2022-06-23 PROCEDURE — 3017F COLORECTAL CA SCREEN DOC REV: CPT | Performed by: INTERNAL MEDICINE

## 2022-06-23 PROCEDURE — 99204 OFFICE O/P NEW MOD 45 MIN: CPT | Performed by: INTERNAL MEDICINE

## 2022-06-23 PROCEDURE — G8420 CALC BMI NORM PARAMETERS: HCPCS | Performed by: INTERNAL MEDICINE

## 2022-06-23 RX ORDER — FLUTICASONE PROPIONATE 50 MCG
1 SPRAY, SUSPENSION (ML) NASAL NIGHTLY
Qty: 32 G | Refills: 1 | Status: SHIPPED | OUTPATIENT
Start: 2022-06-23 | End: 2022-07-29

## 2022-06-23 RX ORDER — PSEUDOEPHEDRINE HCL 30 MG
TABLET ORAL
Status: ON HOLD | COMMUNITY
Start: 2019-10-21 | End: 2022-07-29 | Stop reason: HOSPADM

## 2022-06-23 RX ORDER — LEVOFLOXACIN 500 MG/1
TABLET, FILM COATED ORAL
COMMUNITY
Start: 2022-06-16 | End: 2022-07-26

## 2022-06-23 NOTE — PROGRESS NOTES
Subjective: Moni Wells is a 62 y.o. male who complains today of:     Chief Complaint   Patient presents with    New Patient     Chronic Lung Disease per Dr. Ellis Cabral       HPI  Patient presents for COPD, preop clearance      Patient presents for evaluation of COPD, preop clearance for left lower lobe superior segment nodule, he is scheduled to do the surgery in the next 2 to 3 weeks, he scheduled to do bladder surgery tomorrow, he report no shortness of breath, he is able to walk 2 miles or more without shortness of breath, he has chronic cough, mainly in the morning, productive of clear phlegm, he is a smoker 1 pack/day, no chest pain, no nasal congestion postnasal drip, no heartburn. His weight is stable, no lower extremity edema. Patient has history of right upper lobectomy for T1 cN0 M0 adenocarcinoma. Recent PET scan shows hyperactive nodule in the left lower lobe superior segment. Allergies:  Patient has no known allergies. Past Medical History:   Diagnosis Date    Arthritis     Asthma     Bipolar disorder ([address]     CAD (coronary artery disease)     Cancer ([address]     R lung cancer, bladder cancer 6 times     Cerebral artery occlusion with cerebral infarction ([address]     COPD (chronic obstructive pulmonary disease) ([address]     Depression     Essential hypertension 3/23/2019    Hepatitis C     Hyperlipidemia     Kidney stone 2015    Melanoma ([address] 2011    L eye was removed     Melanoma ([address] 2011    L eye was removed      Past Surgical History:   Procedure Laterality Date    CORONARY ANGIOPLASTY WITH STENT PLACEMENT Left     stents placed to L carotid (2015) L arm (6/2017)    EYE SURGERY      melanoma took L eye    [place] [address] INCL FLUOR GDNCE DX W/CELL WASHG SPX N/A 7/13/2018    BRONCHOSCOPY performed by Newton Dobbs MD at [address] [place] [address]       History reviewed. No pertinent family history.   Social History     Socioeconomic History    Marital status: Single Spouse name: Not on file    Number of children: Not on file    Years of education: Not on file    Highest education level: Not on file   Occupational History    Not on file   Tobacco Use    Smoking status: Current Every Day Smoker     Packs/day: 0.50     Years: 50.00     Pack years: 25.00     Types: Cigarettes    Smokeless tobacco: Never Used    Tobacco comment: Patient states he quit yesterday   Vaping Use    Vaping Use: Never used   Substance and Sexual Activity    Alcohol use: No    Drug use: No     Types: Marijuana (Weed)     Comment: repoerts using occassionally, last use 2 days ago    Sexual activity: Not on file   Other Topics Concern    Not on file   Social History Narrative    Not on file     Social Determinants of Health     Financial Resource Strain:     Difficulty of Paying Living Expenses: Not on file   Food Insecurity:     Worried About [address] in the Last Year: Not on file    [address] in the Last Year: Not on file   Transportation Needs:     Lack of Transportation (Medical): Not on file    Lack of Transportation (Non-Medical):  Not on file   Physical Activity:     Days of Exercise per Week: Not on file    Minutes of Exercise per Session: Not on file   Stress:     Feeling of Stress : Not on file   Social Connections:     Frequency of Communication with Friends and Family: Not on file    Frequency of Social Gatherings with Friends and Family: Not on file    Attends Worship Services: Not on file    Active Member of Clubs or Organizations: Not on file    Attends Club or Organization Meetings: Not on file    Marital Status: Not on file   Intimate Partner Violence:     Fear of Current or Ex-Partner: Not on file    Emotionally Abused: Not on file    Physically Abused: Not on file    Sexually Abused: Not on file   Housing Stability:     Unable to Pay for Housing in the Last Year: Not on file    Number of [place] in the Last Year: Not on file    Unstable Housing in the Last Year: Not on file         Review of Systems      ROS: 10 organs review of system is done including general, psychological, ENT, hematological, endocrine, respiratory, cardiovascular, gastrointestinal,musculoskeletal, neurological,  allergy and Immunology is done and is otherwise negative. Current Outpatient Medications   Medication Sig Dispense Refill    levoFLOXacin (LEVAQUIN) 500 MG tablet TAKE 1 TABLET ONCE DAILY      docusate (COLACE, DULCOLAX) 100 MG CAPS Take by mouth      fluticasone (FLONASE) 50 MCG/ACT nasal spray 1 spray by Each Nostril route at bedtime 32 g 1    oxyCODONE-acetaminophen (PERCOCET)  MG per tablet Take 1 tablet by mouth every 4 hours as needed for Pain for up to 30 days. Intended supply: 30 days 180 tablet 0    fluticasone-salmeterol (ADVAIR) 100-50 MCG/DOSE diskus inhaler Inhale 1 puff into the lungs every 12 hours      dilTIAZem (DILACOR XR) 180 MG extended release capsule Take 180 mg by mouth daily      nitroGLYCERIN (NITROSTAT) 0.4 MG SL tablet DISSOLVE 1 (ONE) TABLET UNDER THE TONGUE AS NEEDED FOR CHEST PAIN. MAY REPEAT EVERY 5 MINUTES IF NEEDED; MAX OF 3 DOSES. IF NO RELIEF, EZRA  1    atorvastatin (LIPITOR) 20 MG tablet TAKE 1 TABLET AT BEDTIME  3    ALPRAZolam (XANAX) 1 MG tablet Take 1 mg by mouth every 12 hours as needed for Anxiety      naloxone 4 MG/0.1ML LIQD nasal spray 1 spray by Nasal route as needed for Opioid Reversal (Patient not taking: Reported on 6/13/2022) 1 each 5    naloxone 4 MG/0.1ML LIQD nasal spray 1 spray by Nasal route as needed for Opioid Reversal (Patient not taking: Reported on 6/23/2022) 1 each 5    Budesonide-Formoterol Fumarate (SYMBICORT IN) Inhale into the lungs (Patient not taking: Reported on 6/23/2022)       No current facility-administered medications for this visit.        Objective:     Vitals:    06/23/22 1505   BP: 129/73   Site: Right Upper Arm   Position: Sitting   Cuff Size: Medium Adult Pulse: 84   Resp: 16   Temp: 97.2 °F (36.2 °C)   TempSrc: Infrared   SpO2: 99%   Weight: 128 lb 3.2 oz (58.2 kg)   Height: 5' 9\" (1.753 m)         Physical Exam  Constitutional:       Appearance: He is well-developed. HENT:      Head: Normocephalic and atraumatic. Eyes:      General:         Left eye: No discharge. Conjunctiva/sclera: Conjunctivae normal.      Pupils: Pupils are equal, round, and reactive to light. Neck:      Vascular: No JVD. Cardiovascular:      Rate and Rhythm: Normal rate and regular rhythm. Heart sounds: Normal heart sounds. No murmur heard. No friction rub. No gallop. Pulmonary:      Effort: Pulmonary effort is normal. No respiratory distress. Breath sounds: Wheezing present. No rales. Chest:      Chest wall: No tenderness. Abdominal:      General: Bowel sounds are normal.      Palpations: Abdomen is soft. Musculoskeletal:         General: No tenderness or deformity. Cervical back: Normal range of motion and neck supple. Right lower leg: No edema. Left lower leg: No edema. Skin:     General: Skin is warm and dry. Neurological:      Mental Status: He is alert and oriented to person, place, and time. Psychiatric:         Judgment: Judgment normal.         Imaging studies reviewed by me PET CT April 2022, and CT chest March 2022, reviewed by me, shows PET positive nodule left lower lobe superior segment  Lab results reviewed in chart  PFT none available for review  ECHO: 2016, normal EF    Assessment and Plan       Diagnosis Orders   1. Pulmonary emphysema, unspecified emphysema type ([address]     2. Lung nodule     3. Nose congestion  fluticasone (FLONASE) 50 MCG/ACT nasal spray   4. Malignant neoplasm of lung, unspecified laterality, unspecified part of lung ([address]     5.  Tobacco dependence due to cigarettes       · History of emphysema/COPD, patient has mild wheezing on exam, however minimal to no symptoms except for chronic bronchitis related cough.  ARISKAT 13%, making patient intermediate risk for pulmonary complication. Arzullah score 31, class IV, 10% risk of respiratory failure. Risk discussed with the patient. · We will need to maximize pulmonary treatment, I will have to review PFT first, will request records from [facility] and I will do a phone visit with him next week to adjust his inhalers  · Okay to proceed with wedge resection, patient is intermediate risk for pulmonary complication. · Start Flonase for nasal congestion  · Smoking cessation strongly recommended, discussed with the patient need to quit smoking for at least 2 weeks prior to lung surgery he is agreeable. No orders of the defined types were placed in this encounter. Orders Placed This Encounter   Medications    fluticasone (FLONASE) 50 MCG/ACT nasal spray     Si spray by Each Nostril route at bedtime     Dispense:  32 g     Refill:  1            Discussed with patient the importance of exercise and weight control and  overall health and well-being. Reviewed with the patient: current clinical status, medications, activities and diet. Side effects, adverse effects of the medication prescribed today, as well as treatment plan and result expectations have been discussed with the patient who expresses understanding and desires to proceed. Return in about 1 week (around 2022).       Ronnie Kong MD

## 2022-06-27 DIAGNOSIS — G89.3 CANCER ASSOCIATED PAIN: ICD-10-CM

## 2022-06-27 RX ORDER — OXYCODONE AND ACETAMINOPHEN 10; 325 MG/1; MG/1
1 TABLET ORAL EVERY 4 HOURS PRN
Qty: 180 TABLET | Refills: 0 | Status: SHIPPED | OUTPATIENT
Start: 2022-06-27 | End: 2022-07-26 | Stop reason: SDUPTHER

## 2022-06-27 NOTE — PROGRESS NOTES
Rx requested:  Requested Prescriptions     Pending Prescriptions Disp Refills    oxyCODONE-acetaminophen (PERCOCET)  MG per tablet 180 tablet 0     Sig: Take 1 tablet by mouth every 4 hours as needed for Pain for up to 30 days.  Intended supply: 30 days       Last Office Visit:   Visit date not found      Last filled:  5/26/22    Next Visit Date:  Future Appointments   Date Time Provider Elizabeth Mirian   7/1/2022  1:00 PM Kayleen Batista MD [address]   8/5/2022  2:00 PM JOS Bradford - CNP [place] [id number]

## 2022-07-01 ENCOUNTER — TELEPHONE (OUTPATIENT)
Dept: CARDIOTHORACIC SURGERY | Age: 62
End: 2022-07-01

## 2022-07-01 ENCOUNTER — TELEMEDICINE (OUTPATIENT)
Dept: PULMONOLOGY | Age: 62
End: 2022-07-01
Payer: MEDICARE

## 2022-07-01 DIAGNOSIS — C34.90 MALIGNANT NEOPLASM OF LUNG, UNSPECIFIED LATERALITY, UNSPECIFIED PART OF LUNG (HCC): ICD-10-CM

## 2022-07-01 DIAGNOSIS — J44.9 CHRONIC OBSTRUCTIVE PULMONARY DISEASE, UNSPECIFIED COPD TYPE (HCC): Primary | ICD-10-CM

## 2022-07-01 DIAGNOSIS — J41.0 SIMPLE CHRONIC BRONCHITIS (HCC): ICD-10-CM

## 2022-07-01 DIAGNOSIS — R91.1 LUNG NODULE: ICD-10-CM

## 2022-07-01 DIAGNOSIS — F17.210 TOBACCO DEPENDENCE DUE TO CIGARETTES: ICD-10-CM

## 2022-07-01 DIAGNOSIS — J43.9 PULMONARY EMPHYSEMA, UNSPECIFIED EMPHYSEMA TYPE (HCC): ICD-10-CM

## 2022-07-01 PROCEDURE — 99214 OFFICE O/P EST MOD 30 MIN: CPT | Performed by: INTERNAL MEDICINE

## 2022-07-01 ASSESSMENT — ENCOUNTER SYMPTOMS
EYES NEGATIVE: 1
GASTROINTESTINAL NEGATIVE: 1

## 2022-07-01 NOTE — PROGRESS NOTES
2022    TELEHEALTH EVALUATION -- Audio/Visual (During LFGJX-77 public health emergency)    Due to Matthreyna 19 outbreak, patient's office visit was converted to a virtual visit. Patient was contacted and agreed to proceed with a virtual visit via Telephone Visit  The risks and benefits of converting to a virtual visit were discussed in light of the current infectious disease epidemic. Patient also understood that insurance coverage and co-pays are up to their individual insurance plans. HPI:    Mollymarcelina Rangel (:  1960) has requested an audio/video evaluation for the following concern(s):    He is doing good, had his bladder surgery done with no issues, currently back home with no symptoms of chest pain or shortness of breath, he is able to do his daily activities with no issues, he has cough productive of clear phlegm and a getting better, he is cutting down on smoking a lot, no lower extremity edema, no fever no chills, his weight is stable. Review of Systems   Constitutional: Negative. HENT: Negative. Eyes: Negative. Gastrointestinal: Negative. Endocrine: Negative. Musculoskeletal: Negative. Skin: Negative. Neurological: Negative. Hematological: Negative. Psychiatric/Behavioral: Negative. Prior to Visit Medications    Medication Sig Taking? Authorizing Provider   oxyCODONE-acetaminophen (PERCOCET)  MG per tablet Take 1 tablet by mouth every 4 hours as needed for Pain for up to 30 days.  Intended supply: 30 days  Alex Lomeli, APRN - CNP   levoFLOXacin (LEVAQUIN) 500 MG tablet TAKE 1 TABLET ONCE DAILY  Historical Provider, MD   docusate (COLACE, DULCOLAX) 100 MG CAPS Take by mouth  Historical Provider, MD   fluticasone (FLONASE) 50 MCG/ACT nasal spray 1 spray by Each Nostril route at bedtime  Eduardo Mariano MD   naloxone 4 MG/0.1ML LIQD nasal spray 1 spray by Nasal route as needed for Opioid Reversal  Patient not taking: Reported on 2022  Rosamaria Polanco JOS Brunson CNP   naloxone 4 MG/0.1ML LIQD nasal spray 1 spray by Nasal route as needed for Opioid Reversal  Patient not taking: Reported on 6/23/2022  JOS Jones CNP   fluticasone-salmeterol (ADVAIR) 100-50 MCG/DOSE diskus inhaler Inhale 1 puff into the lungs every 12 hours  Historical Provider, MD   dilTIAZem (DILACOR XR) 180 MG extended release capsule Take 180 mg by mouth daily  Historical Provider, MD   nitroGLYCERIN (NITROSTAT) 0.4 MG SL tablet DISSOLVE 1 (ONE) TABLET UNDER THE TONGUE AS NEEDED FOR CHEST PAIN. MAY REPEAT EVERY 5 MINUTES IF NEEDED; MAX OF 3 DOSES.   IF NO RELIEF, EZRA  Historical Provider, MD   atorvastatin (LIPITOR) 20 MG tablet TAKE 1 TABLET AT BEDTIME  Historical Provider, MD   ALPRAZolam (XANAX) 1 MG tablet Take 1 mg by mouth every 12 hours as needed for Anxiety  Historical Provider, MD       Social History     Tobacco Use    Smoking status: Current Every Day Smoker     Packs/day: 0.50     Years: 50.00     Pack years: 25.00     Types: Cigarettes    Smokeless tobacco: Never Used    Tobacco comment: Patient states he quit yesterday   Vaping Use    Vaping Use: Never used   Substance Use Topics    Alcohol use: No    Drug use: No     Types: Marijuana (Weed)     Comment: repoerts using occassionally, last use 2 days ago        No Known Allergies,   Past Medical History:   Diagnosis Date    Arthritis     Asthma     Bipolar disorder ([address]     CAD (coronary artery disease)     Cancer ([address]     R lung cancer, bladder cancer 6 times     Cerebral artery occlusion with cerebral infarction ([address]     COPD (chronic obstructive pulmonary disease) ([address]     Depression     Essential hypertension 3/23/2019    Hepatitis C     Hyperlipidemia     Kidney stone 2015    Melanoma ([address] 2011    L eye was removed     Melanoma ([address] 2011    L eye was removed    ,   Past Surgical History:   Procedure Laterality Date    CORONARY ANGIOPLASTY WITH STENT PLACEMENT Left     stents placed to L carotid (2015) L arm (6/2017)    EYE SURGERY      melanoma took L eye    [place] [address] INCL FLUOR GDNCE DX W/CELL WASHG SPX N/A 7/13/2018    BRONCHOSCOPY performed by Matthew Garcia MD at [address] [place] [address]     ,   Social History     Tobacco Use    Smoking status: Current Every Day Smoker     Packs/day: 0.50     Years: 50.00     Pack years: 25.00     Types: Cigarettes    Smokeless tobacco: Never Used    Tobacco comment: Patient states he quit yesterday   Vaping Use    Vaping Use: Never used   Substance Use Topics    Alcohol use: No    Drug use: No     Types: Marijuana (Weed)     Comment: repoerts using occassionally, last use 2 days ago   ,   No family history on file.,     There is no immunization history on file for this patient.,   Health Maintenance   Topic Date Due    Annual Wellness Visit (AWV)  Never done    COVID-19 Vaccine (1) Never done    Lipids  Never done    Depression Monitoring  Never done    HIV screen  Never done    DTaP/Tdap/Td vaccine (1 - Tdap) Never done    Prostate Specific Antigen (PSA) Screening or Monitoring  Never done    Colorectal Cancer Screen  Never done    Shingles vaccine (1 of 2) Never done    Low dose CT lung screening  Never done    Pneumococcal 0-64 years Vaccine (2 - PCV) 11/01/2017    Flu vaccine (1) 09/01/2022    Hepatitis A vaccine  Aged Out    Hepatitis B vaccine  Aged Out    Hib vaccine  Aged Out    Meningococcal (ACWY) vaccine  Aged Out           PHYSICAL EXAMINATION:  [ INSTRUCTIONS:  \"[x]\" Indicates a positive item  \"[]\" Indicates a negative item  -- DELETE ALL ITEMS NOT EXAMINED]  [x] Alert  [] Oriented to person/place/time    [] No apparent distress  [] Toxic appearing    [] Face flushed appearing [] Sclera clear  [] Lips are cyanotic      [] Breathing appears normal  [] Appears tachypneic      [] No rash on visible skin    [] Cranial Nerves II-XII grossly intact    [] Motor grossly intact in visible upper extremities    [] Motor grossly intact in visible lower extremities    [] Normal Mood  [] Anxious appearing    [] Depressed appearing  [] Confused appearing      [] Poor short term memory  [] Poor long term memory    [] OTHER:      Due to this being a TeleHealth encounter, evaluation of the following organ systems is limited: Vitals/Constitutional/EENT/Resp/CV/GI//MS/Neuro/Skin/Heme-Lymph-Imm. Imaging studies PET CT scan April 2022, PET positive nodule left lower lobe superior segment  Labs reviewed   PFT not available yet  Echo 2016 normal EF      ASSESSMENT/PLAN:  1. Chronic obstructive pulmonary disease, unspecified COPD type ([address]  PFTs not available yet,  He has order for new PFT by thoracic surgery he will schedule and I will see him after that  Continue Advair for now symptoms controlled    2. Pulmonary emphysema, unspecified emphysema type ([address]  Same as #1    3. Lung nodule  Plan for wedge resection, thoracic surgery following, nodule is PET positive concerning for malignancy    4. Malignant neoplasm of lung, unspecified laterality, unspecified part of lung ([address]  T1c N0 M0 adenocarcinoma, status post right upper lobectomy 2 years and 7 months ago    5. Tobacco dependence due to cigarettes  Smoking cessation counseling done again    6. Simple chronic bronchitis ([address]  Continue Advair      Return in about 6 weeks (around 8/12/2022). Total time 22 minutes  An  electronic signature was used to authenticate this note. --Guillermo Johnson MD on 7/1/2022 at 1:17 PM        Pursuant to the emergency declaration under the [facility], [facility] waiver authority and the [employer] and [employer] Act, this Virtual  Visit was conducted, with patient's consent, to reduce the patient's risk of exposure to COVID-19 and provide continuity of care for an established patient.     Services were provided through a video synchronous discussion virtually to substitute for in-person clinic visit.

## 2022-07-01 NOTE — Clinical Note
Up in 6 weeks after PFT  Please try to obtain PFT from [facility] he signed release of records last visit

## 2022-07-01 NOTE — TELEPHONE ENCOUNTER
Patient called stating he had his bladder surgery on 6/24/2022. He only had to stay in hospital for 3 days. States he is feeling fine and healthy and wants to proceed with lung surgery discussed at 6/6/2022 visit. Patient states he needs 72 hour notice in order to get transportation arranged.  Patient aware that he may not be contacted until Tuesday 7/5/2022

## 2022-07-05 ENCOUNTER — TELEPHONE (OUTPATIENT)
Dept: CARDIOTHORACIC SURGERY | Age: 62
End: 2022-07-05

## 2022-07-05 NOTE — TELEPHONE ENCOUNTER
Patient underwent his bladder surgery at another hospital sometime ago. He was only in the hospital for 3 days and has recovered extremely well. He called the office and he feels strong of to have his lung surgery. He denies any shortness of breath. He has not restarted his antiplatelet therapy as they are waiting till after the lung surgery. I went over once again a left VATS lower lobe wedge only. The patient understands the risk benefits potential outcomes alternative therapies and agrees to proceed. Surgical date is July 12.

## 2022-07-14 ENCOUNTER — TELEPHONE (OUTPATIENT)
Dept: CARDIOTHORACIC SURGERY | Age: 62
End: 2022-07-14

## 2022-07-14 NOTE — TELEPHONE ENCOUNTER
Pt calling to reschedule surgery that was cancelled this week with Dr. Jamila Burrell. Originally scheduled for 7/12/22 but patient cancelled due to illness.     Patient rescheduled for surgery on 7/28/22. PAT appt 7/21/22 at . Patient aware of appointments and instructions.

## 2022-07-26 ENCOUNTER — HOSPITAL ENCOUNTER (OUTPATIENT)
Dept: PREADMISSION TESTING | Age: 62
Discharge: HOME | DRG: 206 | End: 2022-07-30
Payer: MEDICARE

## 2022-07-26 VITALS
HEIGHT: 69 IN | DIASTOLIC BLOOD PRESSURE: 64 MMHG | HEART RATE: 84 BPM | BODY MASS INDEX: 17.86 KG/M2 | OXYGEN SATURATION: 98 % | SYSTOLIC BLOOD PRESSURE: 110 MMHG | RESPIRATION RATE: 16 BRPM | WEIGHT: 120.6 LBS | TEMPERATURE: 98.3 F

## 2022-07-26 DIAGNOSIS — G89.3 CANCER ASSOCIATED PAIN: ICD-10-CM

## 2022-07-26 LAB
ABO/RH: NORMAL
ANION GAP SERPL CALCULATED.3IONS-SCNC: 11 MEQ/L (ref 9–15)
ANTIBODY SCREEN: NORMAL
BUN BLDV-MCNC: 13 MG/DL (ref 8–23)
CALCIUM SERPL-MCNC: 10 MG/DL (ref 8.5–9.9)
CHLORIDE BLD-SCNC: 104 MEQ/L (ref 95–107)
CO2: 27 MEQ/L (ref 20–31)
CREAT SERPL-MCNC: 0.95 MG/DL (ref 0.7–1.2)
GFR AFRICAN AMERICAN: >60
GFR NON-AFRICAN AMERICAN: >60
GLUCOSE BLD-MCNC: 104 MG/DL (ref 70–99)
HCT VFR BLD CALC: 33.2 % (ref 42–52)
HEMOGLOBIN: 10.4 G/DL (ref 14–18)
MCH RBC QN AUTO: 22.7 PG (ref 27–31.3)
MCHC RBC AUTO-ENTMCNC: 31.5 % (ref 33–37)
MCV RBC AUTO: 72.1 FL (ref 80–100)
PDW BLD-RTO: 17.8 % (ref 11.5–14.5)
PLATELET # BLD: 251 K/UL (ref 130–400)
POTASSIUM SERPL-SCNC: 4.9 MEQ/L (ref 3.4–4.9)
RBC # BLD: 4.6 M/UL (ref 4.7–6.1)
SODIUM BLD-SCNC: 142 MEQ/L (ref 135–144)
WBC # BLD: 6.1 K/UL (ref 4.8–10.8)

## 2022-07-26 PROCEDURE — 86850 RBC ANTIBODY SCREEN: CPT

## 2022-07-26 PROCEDURE — 85027 COMPLETE CBC AUTOMATED: CPT

## 2022-07-26 PROCEDURE — 93005 ELECTROCARDIOGRAM TRACING: CPT | Performed by: THORACIC SURGERY (CARDIOTHORACIC VASCULAR SURGERY)

## 2022-07-26 PROCEDURE — 86900 BLOOD TYPING SEROLOGIC ABO: CPT

## 2022-07-26 PROCEDURE — 86901 BLOOD TYPING SEROLOGIC RH(D): CPT

## 2022-07-26 PROCEDURE — 80048 BASIC METABOLIC PNL TOTAL CA: CPT

## 2022-07-26 RX ORDER — LIDOCAINE HYDROCHLORIDE 10 MG/ML
1 INJECTION, SOLUTION EPIDURAL; INFILTRATION; INTRACAUDAL; PERINEURAL
Status: CANCELLED | OUTPATIENT
Start: 2022-07-26 | End: 2022-07-26

## 2022-07-26 RX ORDER — SODIUM CHLORIDE, SODIUM LACTATE, POTASSIUM CHLORIDE, CALCIUM CHLORIDE 600; 310; 30; 20 MG/100ML; MG/100ML; MG/100ML; MG/100ML
INJECTION, SOLUTION INTRAVENOUS CONTINUOUS
Status: CANCELLED | OUTPATIENT
Start: 2022-07-26

## 2022-07-26 RX ORDER — OXYCODONE AND ACETAMINOPHEN 10; 325 MG/1; MG/1
1 TABLET ORAL EVERY 4 HOURS PRN
Qty: 180 TABLET | Refills: 0 | Status: SHIPPED | OUTPATIENT
Start: 2022-07-26 | End: 2022-08-24 | Stop reason: SDUPTHER

## 2022-07-26 RX ORDER — SODIUM CHLORIDE 0.9 % (FLUSH) 0.9 %
5-40 SYRINGE (ML) INJECTION PRN
Status: CANCELLED | OUTPATIENT
Start: 2022-07-26

## 2022-07-26 RX ORDER — SODIUM CHLORIDE 9 MG/ML
INJECTION, SOLUTION INTRAVENOUS PRN
Status: CANCELLED | OUTPATIENT
Start: 2022-07-26

## 2022-07-26 RX ORDER — SODIUM CHLORIDE 0.9 % (FLUSH) 0.9 %
5-40 SYRINGE (ML) INJECTION EVERY 12 HOURS SCHEDULED
Status: CANCELLED | OUTPATIENT
Start: 2022-07-26

## 2022-07-26 NOTE — PROGRESS NOTES
Rx requested:  Requested Prescriptions     Pending Prescriptions Disp Refills    oxyCODONE-acetaminophen (PERCOCET)  MG per tablet 180 tablet 0     Sig: Take 1 tablet by mouth every 4 hours as needed for Pain for up to 30 days.  Intended supply: 30 days       Last Office Visit:   Visit date not found      Last filled:  6/27/22    Next Visit Date:  Future Appointments   Date Time Provider Elizabeth [place]   7/26/2022  3:00 PM MARTHA HURTADO RM 3 MARTHA HURTADO [facility]   8/5/2022  2:00 PM JOS Vaughn - CNP [place] [address]

## 2022-07-27 LAB
EKG ATRIAL RATE: 83 BPM
EKG P AXIS: 78 DEGREES
EKG P-R INTERVAL: 144 MS
EKG Q-T INTERVAL: 372 MS
EKG QRS DURATION: 78 MS
EKG QTC CALCULATION (BAZETT): 437 MS
EKG R AXIS: 77 DEGREES
EKG T AXIS: 75 DEGREES
EKG VENTRICULAR RATE: 83 BPM

## 2022-07-28 ENCOUNTER — APPOINTMENT (OUTPATIENT)
Dept: GENERAL RADIOLOGY | Age: 62
DRG: 206 | End: 2022-07-28
Attending: THORACIC SURGERY (CARDIOTHORACIC VASCULAR SURGERY)
Payer: MEDICARE

## 2022-07-28 ENCOUNTER — HOSPITAL ENCOUNTER (INPATIENT)
Age: 62
LOS: 1 days | Discharge: HOME | DRG: 206 | End: 2022-07-29
Attending: THORACIC SURGERY (CARDIOTHORACIC VASCULAR SURGERY) | Admitting: THORACIC SURGERY (CARDIOTHORACIC VASCULAR SURGERY)
Payer: MEDICARE

## 2022-07-28 ENCOUNTER — ANESTHESIA EVENT (OUTPATIENT)
Dept: OPERATING ROOM | Age: 62
DRG: 206 | End: 2022-07-28
Payer: MEDICARE

## 2022-07-28 ENCOUNTER — ANESTHESIA (OUTPATIENT)
Dept: OPERATING ROOM | Age: 62
DRG: 206 | End: 2022-07-28
Payer: MEDICARE

## 2022-07-28 DIAGNOSIS — R91.8 LUNG MASS: ICD-10-CM

## 2022-07-28 PROCEDURE — 2500000003 HC RX 250 WO HCPCS: Performed by: NURSE ANESTHETIST, CERTIFIED REGISTERED

## 2022-07-28 PROCEDURE — 6360000002 HC RX W HCPCS: Performed by: THORACIC SURGERY (CARDIOTHORACIC VASCULAR SURGERY)

## 2022-07-28 PROCEDURE — 2580000003 HC RX 258: Performed by: NURSE PRACTITIONER

## 2022-07-28 PROCEDURE — 2580000003 HC RX 258: Performed by: STUDENT IN AN ORGANIZED HEALTH CARE EDUCATION/TRAINING PROGRAM

## 2022-07-28 PROCEDURE — 2580000003 HC RX 258: Performed by: THORACIC SURGERY (CARDIOTHORACIC VASCULAR SURGERY)

## 2022-07-28 PROCEDURE — 6370000000 HC RX 637 (ALT 250 FOR IP): Performed by: THORACIC SURGERY (CARDIOTHORACIC VASCULAR SURGERY)

## 2022-07-28 PROCEDURE — 3700000001 HC ADD 15 MINUTES (ANESTHESIA): Performed by: THORACIC SURGERY (CARDIOTHORACIC VASCULAR SURGERY)

## 2022-07-28 PROCEDURE — 3600000013 HC SURGERY LEVEL 3 ADDTL 15MIN: Performed by: THORACIC SURGERY (CARDIOTHORACIC VASCULAR SURGERY)

## 2022-07-28 PROCEDURE — 94761 N-INVAS EAR/PLS OXIMETRY MLT: CPT

## 2022-07-28 PROCEDURE — 76942 ECHO GUIDE FOR BIOPSY: CPT | Performed by: STUDENT IN AN ORGANIZED HEALTH CARE EDUCATION/TRAINING PROGRAM

## 2022-07-28 PROCEDURE — 71045 X-RAY EXAM CHEST 1 VIEW: CPT

## 2022-07-28 PROCEDURE — 7100000000 HC PACU RECOVERY - FIRST 15 MIN: Performed by: THORACIC SURGERY (CARDIOTHORACIC VASCULAR SURGERY)

## 2022-07-28 PROCEDURE — 6360000002 HC RX W HCPCS: Performed by: STUDENT IN AN ORGANIZED HEALTH CARE EDUCATION/TRAINING PROGRAM

## 2022-07-28 PROCEDURE — 6360000002 HC RX W HCPCS: Performed by: NURSE ANESTHETIST, CERTIFIED REGISTERED

## 2022-07-28 PROCEDURE — 3600000003 HC SURGERY LEVEL 3 BASE: Performed by: THORACIC SURGERY (CARDIOTHORACIC VASCULAR SURGERY)

## 2022-07-28 PROCEDURE — C1729 CATH, DRAINAGE: HCPCS | Performed by: THORACIC SURGERY (CARDIOTHORACIC VASCULAR SURGERY)

## 2022-07-28 PROCEDURE — 88341 IMHCHEM/IMCYTCHM EA ADD ANTB: CPT

## 2022-07-28 PROCEDURE — 2720000010 HC SURG SUPPLY STERILE: Performed by: THORACIC SURGERY (CARDIOTHORACIC VASCULAR SURGERY)

## 2022-07-28 PROCEDURE — 2709999900 HC NON-CHARGEABLE SUPPLY: Performed by: THORACIC SURGERY (CARDIOTHORACIC VASCULAR SURGERY)

## 2022-07-28 PROCEDURE — 7100000001 HC PACU RECOVERY - ADDTL 15 MIN: Performed by: THORACIC SURGERY (CARDIOTHORACIC VASCULAR SURGERY)

## 2022-07-28 PROCEDURE — 88309 TISSUE EXAM BY PATHOLOGIST: CPT

## 2022-07-28 PROCEDURE — 32666 THORACOSCOPY W/WEDGE RESECT: CPT | Performed by: THORACIC SURGERY (CARDIOTHORACIC VASCULAR SURGERY)

## 2022-07-28 PROCEDURE — 88342 IMHCHEM/IMCYTCHM 1ST ANTB: CPT

## 2022-07-28 PROCEDURE — 3700000000 HC ANESTHESIA ATTENDED CARE: Performed by: THORACIC SURGERY (CARDIOTHORACIC VASCULAR SURGERY)

## 2022-07-28 PROCEDURE — 0BBJ8ZZ EXCISION OF LEFT LOWER LUNG LOBE, VIA NATURAL OR ARTIFICIAL OPENING ENDOSCOPIC: ICD-10-PCS | Performed by: THORACIC SURGERY (CARDIOTHORACIC VASCULAR SURGERY)

## 2022-07-28 PROCEDURE — 2060000000 HC ICU INTERMEDIATE R&B

## 2022-07-28 RX ORDER — SODIUM CHLORIDE 0.9 % (FLUSH) 0.9 %
5-40 SYRINGE (ML) INJECTION EVERY 12 HOURS SCHEDULED
Status: DISCONTINUED | OUTPATIENT
Start: 2022-07-28 | End: 2022-07-28 | Stop reason: HOSPADM

## 2022-07-28 RX ORDER — LIDOCAINE HYDROCHLORIDE 10 MG/ML
INJECTION, SOLUTION EPIDURAL; INFILTRATION; INTRACAUDAL; PERINEURAL PRN
Status: DISCONTINUED | OUTPATIENT
Start: 2022-07-28 | End: 2022-07-28 | Stop reason: SDUPTHER

## 2022-07-28 RX ORDER — MAGNESIUM HYDROXIDE 1200 MG/15ML
LIQUID ORAL PRN
Status: DISCONTINUED | OUTPATIENT
Start: 2022-07-28 | End: 2022-07-28 | Stop reason: HOSPADM

## 2022-07-28 RX ORDER — KETOROLAC TROMETHAMINE 30 MG/ML
30 INJECTION, SOLUTION INTRAMUSCULAR; INTRAVENOUS EVERY 6 HOURS PRN
Status: DISCONTINUED | OUTPATIENT
Start: 2022-07-28 | End: 2022-07-29 | Stop reason: HOSPADM

## 2022-07-28 RX ORDER — SODIUM CHLORIDE, SODIUM LACTATE, POTASSIUM CHLORIDE, CALCIUM CHLORIDE 600; 310; 30; 20 MG/100ML; MG/100ML; MG/100ML; MG/100ML
INJECTION, SOLUTION INTRAVENOUS CONTINUOUS
Status: DISCONTINUED | OUTPATIENT
Start: 2022-07-28 | End: 2022-07-28 | Stop reason: HOSPADM

## 2022-07-28 RX ORDER — POLYETHYLENE GLYCOL 3350 17 G/17G
17 POWDER, FOR SOLUTION ORAL DAILY PRN
Status: DISCONTINUED | OUTPATIENT
Start: 2022-07-28 | End: 2022-07-29

## 2022-07-28 RX ORDER — OXYCODONE HYDROCHLORIDE 5 MG/1
10 TABLET ORAL PRN
Status: DISCONTINUED | OUTPATIENT
Start: 2022-07-28 | End: 2022-07-28 | Stop reason: HOSPADM

## 2022-07-28 RX ORDER — SODIUM CHLORIDE 0.9 % (FLUSH) 0.9 %
5-40 SYRINGE (ML) INJECTION PRN
Status: DISCONTINUED | OUTPATIENT
Start: 2022-07-28 | End: 2022-07-28 | Stop reason: HOSPADM

## 2022-07-28 RX ORDER — ACETAMINOPHEN 325 MG/1
650 TABLET ORAL EVERY 4 HOURS PRN
Status: DISCONTINUED | OUTPATIENT
Start: 2022-07-28 | End: 2022-07-29 | Stop reason: HOSPADM

## 2022-07-28 RX ORDER — PROPOFOL 10 MG/ML
INJECTION, EMULSION INTRAVENOUS PRN
Status: DISCONTINUED | OUTPATIENT
Start: 2022-07-28 | End: 2022-07-28 | Stop reason: SDUPTHER

## 2022-07-28 RX ORDER — NALOXONE HYDROCHLORIDE 0.4 MG/ML
INJECTION, SOLUTION INTRAMUSCULAR; INTRAVENOUS; SUBCUTANEOUS PRN
Status: DISCONTINUED | OUTPATIENT
Start: 2022-07-28 | End: 2022-07-29

## 2022-07-28 RX ORDER — DIPHENHYDRAMINE HYDROCHLORIDE 50 MG/ML
12.5 INJECTION INTRAMUSCULAR; INTRAVENOUS
Status: DISCONTINUED | OUTPATIENT
Start: 2022-07-28 | End: 2022-07-28 | Stop reason: HOSPADM

## 2022-07-28 RX ORDER — POTASSIUM CHLORIDE 7.45 MG/ML
10 INJECTION INTRAVENOUS PRN
Status: DISCONTINUED | OUTPATIENT
Start: 2022-07-28 | End: 2022-07-29 | Stop reason: HOSPADM

## 2022-07-28 RX ORDER — ALPRAZOLAM 1 MG/1
1 TABLET ORAL EVERY 12 HOURS PRN
Status: DISCONTINUED | OUTPATIENT
Start: 2022-07-28 | End: 2022-07-29 | Stop reason: HOSPADM

## 2022-07-28 RX ORDER — PROCHLORPERAZINE EDISYLATE 5 MG/ML
5 INJECTION INTRAMUSCULAR; INTRAVENOUS
Status: DISCONTINUED | OUTPATIENT
Start: 2022-07-28 | End: 2022-07-28 | Stop reason: HOSPADM

## 2022-07-28 RX ORDER — MEPERIDINE HYDROCHLORIDE 25 MG/ML
12.5 INJECTION INTRAMUSCULAR; INTRAVENOUS; SUBCUTANEOUS EVERY 5 MIN PRN
Status: DISCONTINUED | OUTPATIENT
Start: 2022-07-28 | End: 2022-07-28 | Stop reason: HOSPADM

## 2022-07-28 RX ORDER — MAGNESIUM SULFATE 1 G/100ML
1000 INJECTION INTRAVENOUS PRN
Status: DISCONTINUED | OUTPATIENT
Start: 2022-07-28 | End: 2022-07-29 | Stop reason: HOSPADM

## 2022-07-28 RX ORDER — ONDANSETRON 2 MG/ML
4 INJECTION INTRAMUSCULAR; INTRAVENOUS EVERY 6 HOURS PRN
Status: DISCONTINUED | OUTPATIENT
Start: 2022-07-28 | End: 2022-07-29 | Stop reason: HOSPADM

## 2022-07-28 RX ORDER — LIDOCAINE HYDROCHLORIDE 10 MG/ML
1 INJECTION, SOLUTION EPIDURAL; INFILTRATION; INTRACAUDAL; PERINEURAL
Status: DISCONTINUED | OUTPATIENT
Start: 2022-07-28 | End: 2022-07-28 | Stop reason: HOSPADM

## 2022-07-28 RX ORDER — MIDAZOLAM HYDROCHLORIDE 1 MG/ML
INJECTION INTRAMUSCULAR; INTRAVENOUS PRN
Status: DISCONTINUED | OUTPATIENT
Start: 2022-07-28 | End: 2022-07-28 | Stop reason: SDUPTHER

## 2022-07-28 RX ORDER — SODIUM CHLORIDE 0.9 % (FLUSH) 0.9 %
5-40 SYRINGE (ML) INJECTION EVERY 12 HOURS SCHEDULED
Status: DISCONTINUED | OUTPATIENT
Start: 2022-07-28 | End: 2022-07-29 | Stop reason: HOSPADM

## 2022-07-28 RX ORDER — ROCURONIUM BROMIDE 10 MG/ML
INJECTION, SOLUTION INTRAVENOUS PRN
Status: DISCONTINUED | OUTPATIENT
Start: 2022-07-28 | End: 2022-07-28 | Stop reason: SDUPTHER

## 2022-07-28 RX ORDER — DEXTROSE, SODIUM CHLORIDE, SODIUM LACTATE, POTASSIUM CHLORIDE, AND CALCIUM CHLORIDE 5; .6; .31; .03; .02 G/100ML; G/100ML; G/100ML; G/100ML; G/100ML
INJECTION, SOLUTION INTRAVENOUS CONTINUOUS
Status: DISCONTINUED | OUTPATIENT
Start: 2022-07-28 | End: 2022-07-29

## 2022-07-28 RX ORDER — LABETALOL HYDROCHLORIDE 5 MG/ML
10 INJECTION, SOLUTION INTRAVENOUS
Status: DISCONTINUED | OUTPATIENT
Start: 2022-07-28 | End: 2022-07-28 | Stop reason: HOSPADM

## 2022-07-28 RX ORDER — ONDANSETRON 2 MG/ML
4 INJECTION INTRAMUSCULAR; INTRAVENOUS
Status: DISCONTINUED | OUTPATIENT
Start: 2022-07-28 | End: 2022-07-28 | Stop reason: HOSPADM

## 2022-07-28 RX ORDER — ENOXAPARIN SODIUM 100 MG/ML
40 INJECTION SUBCUTANEOUS DAILY
Status: DISCONTINUED | OUTPATIENT
Start: 2022-07-29 | End: 2022-07-29 | Stop reason: HOSPADM

## 2022-07-28 RX ORDER — OXYCODONE HYDROCHLORIDE 5 MG/1
5 TABLET ORAL PRN
Status: DISCONTINUED | OUTPATIENT
Start: 2022-07-28 | End: 2022-07-28 | Stop reason: HOSPADM

## 2022-07-28 RX ORDER — ATORVASTATIN CALCIUM 10 MG/1
10 TABLET, FILM COATED ORAL NIGHTLY
Status: DISCONTINUED | OUTPATIENT
Start: 2022-07-28 | End: 2022-07-29 | Stop reason: HOSPADM

## 2022-07-28 RX ORDER — FENTANYL CITRATE 50 UG/ML
INJECTION, SOLUTION INTRAMUSCULAR; INTRAVENOUS PRN
Status: DISCONTINUED | OUTPATIENT
Start: 2022-07-28 | End: 2022-07-28 | Stop reason: SDUPTHER

## 2022-07-28 RX ORDER — FENTANYL CITRATE 50 UG/ML
50 INJECTION, SOLUTION INTRAMUSCULAR; INTRAVENOUS EVERY 5 MIN PRN
Status: COMPLETED | OUTPATIENT
Start: 2022-07-28 | End: 2022-07-28

## 2022-07-28 RX ORDER — SODIUM CHLORIDE 9 MG/ML
INJECTION, SOLUTION INTRAVENOUS PRN
Status: DISCONTINUED | OUTPATIENT
Start: 2022-07-28 | End: 2022-07-28 | Stop reason: HOSPADM

## 2022-07-28 RX ORDER — SODIUM CHLORIDE, SODIUM LACTATE, POTASSIUM CHLORIDE, CALCIUM CHLORIDE 600; 310; 30; 20 MG/100ML; MG/100ML; MG/100ML; MG/100ML
INJECTION, SOLUTION INTRAVENOUS CONTINUOUS
Status: DISCONTINUED | OUTPATIENT
Start: 2022-07-28 | End: 2022-07-29

## 2022-07-28 RX ORDER — SODIUM CHLORIDE 0.9 % (FLUSH) 0.9 %
5-40 SYRINGE (ML) INJECTION PRN
Status: DISCONTINUED | OUTPATIENT
Start: 2022-07-28 | End: 2022-07-29 | Stop reason: HOSPADM

## 2022-07-28 RX ORDER — SODIUM CHLORIDE 9 MG/ML
INJECTION, SOLUTION INTRAVENOUS PRN
Status: DISCONTINUED | OUTPATIENT
Start: 2022-07-28 | End: 2022-07-29 | Stop reason: HOSPADM

## 2022-07-28 RX ORDER — FAMOTIDINE 20 MG/1
20 TABLET, FILM COATED ORAL 2 TIMES DAILY
Status: DISCONTINUED | OUTPATIENT
Start: 2022-07-28 | End: 2022-07-29 | Stop reason: HOSPADM

## 2022-07-28 RX ORDER — FENTANYL CITRATE 50 UG/ML
25 INJECTION, SOLUTION INTRAMUSCULAR; INTRAVENOUS EVERY 5 MIN PRN
Status: DISCONTINUED | OUTPATIENT
Start: 2022-07-28 | End: 2022-07-28 | Stop reason: HOSPADM

## 2022-07-28 RX ORDER — DEXAMETHASONE SODIUM PHOSPHATE 10 MG/ML
INJECTION INTRAMUSCULAR; INTRAVENOUS PRN
Status: DISCONTINUED | OUTPATIENT
Start: 2022-07-28 | End: 2022-07-28 | Stop reason: SDUPTHER

## 2022-07-28 RX ORDER — ONDANSETRON 4 MG/1
4 TABLET, ORALLY DISINTEGRATING ORAL EVERY 8 HOURS PRN
Status: DISCONTINUED | OUTPATIENT
Start: 2022-07-28 | End: 2022-07-29 | Stop reason: HOSPADM

## 2022-07-28 RX ORDER — HYDRALAZINE HYDROCHLORIDE 20 MG/ML
10 INJECTION INTRAMUSCULAR; INTRAVENOUS
Status: DISCONTINUED | OUTPATIENT
Start: 2022-07-28 | End: 2022-07-28 | Stop reason: HOSPADM

## 2022-07-28 RX ORDER — SODIUM CHLORIDE 9 MG/ML
25 INJECTION, SOLUTION INTRAVENOUS PRN
Status: DISCONTINUED | OUTPATIENT
Start: 2022-07-28 | End: 2022-07-28 | Stop reason: HOSPADM

## 2022-07-28 RX ORDER — ONDANSETRON 2 MG/ML
INJECTION INTRAMUSCULAR; INTRAVENOUS PRN
Status: DISCONTINUED | OUTPATIENT
Start: 2022-07-28 | End: 2022-07-28 | Stop reason: SDUPTHER

## 2022-07-28 RX ADMIN — CEFAZOLIN 2000 MG: 10 INJECTION, POWDER, FOR SOLUTION INTRAVENOUS at 08:40

## 2022-07-28 RX ADMIN — ALPRAZOLAM 1 MG: 1 TABLET ORAL at 21:25

## 2022-07-28 RX ADMIN — FENTANYL CITRATE 50 MCG: 50 INJECTION, SOLUTION INTRAMUSCULAR; INTRAVENOUS at 09:26

## 2022-07-28 RX ADMIN — PHENYLEPHRINE HYDROCHLORIDE 100 MCG: 10 INJECTION INTRAVENOUS at 08:47

## 2022-07-28 RX ADMIN — Medication 10 ML: at 21:38

## 2022-07-28 RX ADMIN — PHENYLEPHRINE HYDROCHLORIDE 100 MCG: 10 INJECTION INTRAVENOUS at 08:44

## 2022-07-28 RX ADMIN — FENTANYL CITRATE 50 MCG: 50 INJECTION, SOLUTION INTRAMUSCULAR; INTRAVENOUS at 09:58

## 2022-07-28 RX ADMIN — ATORVASTATIN CALCIUM 10 MG: 10 TABLET, FILM COATED ORAL at 21:21

## 2022-07-28 RX ADMIN — MIDAZOLAM HYDROCHLORIDE 2 MG: 1 INJECTION, SOLUTION INTRAMUSCULAR; INTRAVENOUS at 08:29

## 2022-07-28 RX ADMIN — LIDOCAINE HYDROCHLORIDE 30 MG: 10 INJECTION, SOLUTION EPIDURAL; INFILTRATION; INTRACAUDAL; PERINEURAL at 08:35

## 2022-07-28 RX ADMIN — PHENYLEPHRINE HYDROCHLORIDE 100 MCG: 10 INJECTION INTRAVENOUS at 08:53

## 2022-07-28 RX ADMIN — Medication: at 10:04

## 2022-07-28 RX ADMIN — SODIUM CHLORIDE, POTASSIUM CHLORIDE, SODIUM LACTATE AND CALCIUM CHLORIDE: 600; 310; 30; 20 INJECTION, SOLUTION INTRAVENOUS at 07:49

## 2022-07-28 RX ADMIN — FENTANYL CITRATE 100 MCG: 50 INJECTION, SOLUTION INTRAMUSCULAR; INTRAVENOUS at 08:35

## 2022-07-28 RX ADMIN — FENTANYL CITRATE 50 MCG: 50 INJECTION, SOLUTION INTRAMUSCULAR; INTRAVENOUS at 09:38

## 2022-07-28 RX ADMIN — FAMOTIDINE 20 MG: 20 TABLET ORAL at 21:21

## 2022-07-28 RX ADMIN — SODIUM CHLORIDE, SODIUM LACTATE, POTASSIUM CHLORIDE, CALCIUM CHLORIDE AND DEXTROSE MONOHYDRATE: 5; 600; 310; 30; 20 INJECTION, SOLUTION INTRAVENOUS at 11:16

## 2022-07-28 RX ADMIN — ONDANSETRON 4 MG: 2 INJECTION INTRAMUSCULAR; INTRAVENOUS at 08:43

## 2022-07-28 RX ADMIN — SODIUM CHLORIDE, POTASSIUM CHLORIDE, SODIUM LACTATE AND CALCIUM CHLORIDE: 600; 310; 30; 20 INJECTION, SOLUTION INTRAVENOUS at 08:33

## 2022-07-28 RX ADMIN — SUGAMMADEX 200 MG: 100 INJECTION, SOLUTION INTRAVENOUS at 09:02

## 2022-07-28 RX ADMIN — ROCURONIUM BROMIDE 50 MG: 10 INJECTION, SOLUTION INTRAVENOUS at 08:35

## 2022-07-28 RX ADMIN — FENTANYL CITRATE 50 MCG: 50 INJECTION, SOLUTION INTRAMUSCULAR; INTRAVENOUS at 09:32

## 2022-07-28 RX ADMIN — PROPOFOL 120 MG: 10 INJECTION, EMULSION INTRAVENOUS at 08:35

## 2022-07-28 RX ADMIN — DEXAMETHASONE SODIUM PHOSPHATE 10 MG: 10 INJECTION INTRAMUSCULAR; INTRAVENOUS at 08:43

## 2022-07-28 RX ADMIN — PHENYLEPHRINE HYDROCHLORIDE 100 MCG: 10 INJECTION INTRAVENOUS at 08:41

## 2022-07-28 RX ADMIN — SODIUM CHLORIDE, POTASSIUM CHLORIDE, SODIUM LACTATE AND CALCIUM CHLORIDE: 600; 310; 30; 20 INJECTION, SOLUTION INTRAVENOUS at 07:47

## 2022-07-28 RX ADMIN — PHENYLEPHRINE HYDROCHLORIDE 100 MCG: 10 INJECTION INTRAVENOUS at 08:50

## 2022-07-28 RX ADMIN — KETOROLAC TROMETHAMINE 30 MG: 30 INJECTION, SOLUTION INTRAMUSCULAR at 21:21

## 2022-07-28 ASSESSMENT — ENCOUNTER SYMPTOMS
COUGH: 0
NAUSEA: 0
CHOKING: 0
WHEEZING: 0
SORE THROAT: 0
VOICE CHANGE: 0
CHEST TIGHTNESS: 0
DIARRHEA: 0
SHORTNESS OF BREATH: 0
STRIDOR: 0
ABDOMINAL PAIN: 0
ABDOMINAL DISTENTION: 0
VOMITING: 0
TROUBLE SWALLOWING: 0

## 2022-07-28 ASSESSMENT — PAIN - FUNCTIONAL ASSESSMENT
PAIN_FUNCTIONAL_ASSESSMENT: PREVENTS OR INTERFERES SOME ACTIVE ACTIVITIES AND ADLS

## 2022-07-28 ASSESSMENT — PAIN DESCRIPTION - LOCATION
LOCATION: CHEST

## 2022-07-28 ASSESSMENT — PAIN DESCRIPTION - DESCRIPTORS
DESCRIPTORS: ACHING

## 2022-07-28 ASSESSMENT — PAIN SCALES - GENERAL
PAINLEVEL_OUTOF10: 9
PAINLEVEL_OUTOF10: 10
PAINLEVEL_OUTOF10: 10
PAINLEVEL_OUTOF10: 9
PAINLEVEL_OUTOF10: 10
PAINLEVEL_OUTOF10: 9

## 2022-07-28 ASSESSMENT — LIFESTYLE VARIABLES: SMOKING_STATUS: 1

## 2022-07-28 NOTE — H&P
History and Physical  Patient: Michael Dominguez  Unit/Bed:MLOZ OR Pool/NONE  YOB: 1960  MRN: [id number]  Acct: [de-identified]   Admitting Diagnosis: Lung mass [R91.8]  Admit Date:  7/28/2022  Hospital Day: 0      Chief Complaint:   Lung mass    History of Present Illness:     Patient is 2 years and 7 months out from a right upper lobectomy for a C4hQ2C5 adenocarcinoma. After the surgery he moved to [place]. Per the patient he developed a recurrence possibly in his mediastinum. He says he underwent chemotherapy and radiation in [place] and has been disease-free. Unfortunate he developed what sound like a very aggressive bladder cancer. After several interventions in [place] he was convinced to move back to our area to be near family. His bladder cancer has recurred and they are planning major pelvic surgery on June 24. During all of this a CAT scan was obtained which showed a new mass in the superior segment of the left lung. Follow-up PET scan showed hyperactivity suspicious for malignancy but no disease anywhere else in the chest.  Referral was made for surgical evaluation. Other than the bladder issue the patient is in his usual state of health. He gets mildly short of breath after walking several blocks but is not on oxygen and can climb stairs. He denies any change in cough or hemoptysis or unexpected weight loss. Unfortunately he is still smoking. Because of multiple bleeding episodes from the bladder cancer his anticoagulation was stopped for his carotid stent. He has not followed up with cardiology.   No Known Allergies    Current Facility-Administered Medications   Medication Dose Route Frequency Provider Last Rate Last Admin    ceFAZolin (ANCEF) 2000 mg in dextrose 5 % 100 mL IVPB  2,000 mg IntraVENous Once Neil Corey MD        lidocaine PF 1 % injection 1 mL  1 mL IntraDERmal Once PRN Vickie Linear, APRN - CNP        lactated ringers infusion   IntraVENous Continuous Kendra Rough Namita Garcia, APRN -  mL/hr at 07/28/22 0747 New Bag at 07/28/22 0747    sodium chloride flush 0.9 % injection 5-40 mL  5-40 mL IntraVENous 2 times per day Geremias Concsalty, APRN - CNP        sodium chloride flush 0.9 % injection 5-40 mL  5-40 mL IntraVENous PRN Geremias Conch, APRN - CNP        0.9 % sodium chloride infusion   IntraVENous PRN Geremias Whaleyh, APRN - CNP        lactated ringers infusion   IntraVENous Continuous Valencia Valentine  mL/hr at 07/28/22 0749 New Bag at 07/28/22 0749       PMHx:  Past Medical History:   Diagnosis Date    Arthritis     Asthma     B[facility] disorder [facility]     CAD (coronary artery disease)     Cancer ([facility]     R lung cancer, bladder cancer 6 times     Cerebral artery occlusion with cerebral infarction ([facility]     COPD (chronic obstructive pulmonary disease) ([facility]     Depression     Essential hypertension 03/23/2019    Hep C w/o coma, chronic ([facility]     took medication for 90 days    Hepatitis C     Hyperlipidemia     Kidney stone 2015    Melanoma ([facility] 2011    L eye was removed     Melanoma ([facility] 2011    L eye was removed        PSHx:  Past Surgical History:   Procedure Laterality Date    CORONARY ANGIOPLASTY WITH STENT PLACEMENT Left     stents placed to L carotid (2015) L arm (6/2017)    EYE SURGERY      melanoma took L eye    [place] [address] INCL FLUOR GDNCE DX W/CELL WASHG SPX N/A 7/13/2018    BRONCHOSCOPY performed by Latisha Zhang MD at [place] [address] Hx:  Social History     Socioeconomic History    Marital status: Single     Spouse name: None    Number of children: None    Years of education: None    Highest education level: None   Tobacco Use    Smoking status: Every Day     Packs/day: 0.50     Years: 50.00     Pack years: 25.00     Types: Cigarettes    Smokeless tobacco: Never    Tobacco comments:     Patient states he quit yesterday   Vaping Use    Vaping Use: Never used   Substance and Sexual Activity    Alcohol use:  No Drug use: No     Types: Marijuana Carina Lockhart)     Comment: repoerts using occassionally, last use 2 days ago       Family Hx:  No family history on file. Review ofSystems:   Review of Systems   Constitutional:  Negative for activity change, appetite change, chills, diaphoresis, fatigue, fever and unexpected weight change. HENT:  Negative for sore throat, trouble swallowing and voice change. Eyes:  Negative for visual disturbance. Respiratory:  Negative for cough, choking, chest tightness, shortness of breath, wheezing and stridor. Cardiovascular:  Negative for chest pain, palpitations and leg swelling. Gastrointestinal:  Negative for abdominal distention, abdominal pain, diarrhea, nausea and vomiting. Genitourinary:  Negative for difficulty urinating. Musculoskeletal:  Negative for gait problem and joint swelling. Skin:  Negative for rash and wound. Neurological:  Negative for dizziness, seizures and light-headedness. Hematological:  Negative for adenopathy. Does not bruise/bleed easily. Psychiatric/Behavioral:  Negative for behavioral problems and confusion. Physical Examination:    /65   Pulse (!) 109   Temp 97.4 °F (36.3 °C) (Temporal)   Resp 16   Ht 5' 8\" (1.727 m)   Wt 120 lb (54.4 kg)   SpO2 100%   BMI 18.25 kg/m²    Physical Exam  Constitutional:       General: He is not in acute distress. Appearance: He is not ill-appearing, toxic-appearing or diaphoretic. HENT:      Head: Normocephalic and atraumatic. Nose: Nose normal.   Eyes:      Extraocular Movements: Extraocular movements intact. Conjunctiva/sclera: Conjunctivae normal.      Pupils: Pupils are equal, round, and reactive to light. Neck:      Vascular: No carotid bruit. Cardiovascular:      Rate and Rhythm: Normal rate and regular rhythm. Heart sounds: Normal heart sounds. No murmur heard. No gallop. Pulmonary:      Effort: Pulmonary effort is normal. No respiratory distress. Breath sounds: Normal breath sounds. No wheezing or rales. Abdominal:      General: Abdomen is flat. Bowel sounds are normal.      Palpations: Abdomen is soft. There is no mass. Tenderness: There is no abdominal tenderness. Musculoskeletal:         General: No swelling. Cervical back: Neck supple. Right lower leg: No edema. Left lower leg: No edema. Lymphadenopathy:      Cervical: No cervical adenopathy. Skin:     General: Skin is warm and dry. Neurological:      General: No focal deficit present. Mental Status: He is alert and oriented to person, place, and time. Psychiatric:         Mood and Affect: Mood normal.         Behavior: Behavior normal.         Thought Content:  Thought content normal.         Judgment: Judgment normal.    Absent left eye    LABS:  CBC:   Lab Results   Component Value Date/Time    WBC 6.1 07/26/2022 03:41 PM    RBC 4.60 07/26/2022 03:41 PM    HGB 10.4 07/26/2022 03:41 PM    HCT 33.2 07/26/2022 03:41 PM    MCV 72.1 07/26/2022 03:41 PM    MCH 22.7 07/26/2022 03:41 PM    MCHC 31.5 07/26/2022 03:41 PM    RDW 17.8 07/26/2022 03:41 PM     07/26/2022 03:41 PM     CBC with Differential:   Lab Results   Component Value Date/Time    WBC 6.1 07/26/2022 03:41 PM    RBC 4.60 07/26/2022 03:41 PM    HGB 10.4 07/26/2022 03:41 PM    HCT 33.2 07/26/2022 03:41 PM     07/26/2022 03:41 PM    MCV 72.1 07/26/2022 03:41 PM    MCH 22.7 07/26/2022 03:41 PM    MCHC 31.5 07/26/2022 03:41 PM    RDW 17.8 07/26/2022 03:41 PM    LYMPHOPCT 32.4 06/17/2017 01:00 PM    MONOPCT 6.8 06/17/2017 01:00 PM    BASOPCT 0.7 06/17/2017 01:00 PM    MONOSABS 0.5 06/17/2017 01:00 PM    LYMPHSABS 2.4 06/17/2017 01:00 PM    EOSABS 0.1 06/17/2017 01:00 PM    BASOSABS 0.0 06/17/2017 01:00 PM     CMP:    Lab Results   Component Value Date/Time     07/26/2022 03:41 PM    K 4.9 07/26/2022 03:41 PM     07/26/2022 03:41 PM    CO2 27 07/26/2022 03:41 PM    BUN 13 07/26/2022 03:41 PM CREATININE 0.95 07/26/2022 03:41 PM    GFRAA >60.0 07/26/2022 03:41 PM    LABGLOM >60.0 07/26/2022 03:41 PM    GLUCOSE 104 07/26/2022 03:41 PM    PROT 7.5 06/11/2018 05:30 PM    LABALBU 4.6 06/11/2018 05:30 PM    CALCIUM 10.0 07/26/2022 03:41 PM    BILITOT 0.7 06/11/2018 05:30 PM    ALKPHOS 68 06/11/2018 05:30 PM    AST 21 06/11/2018 05:30 PM    ALT 16 06/11/2018 05:30 PM     BMP:    Lab Results   Component Value Date/Time     07/26/2022 03:41 PM    K 4.9 07/26/2022 03:41 PM     07/26/2022 03:41 PM    CO2 27 07/26/2022 03:41 PM    BUN 13 07/26/2022 03:41 PM    LABALBU 4.6 06/11/2018 05:30 PM    CREATININE 0.95 07/26/2022 03:41 PM    CALCIUM 10.0 07/26/2022 03:41 PM    GFRAA >60.0 07/26/2022 03:41 PM    LABGLOM >60.0 07/26/2022 03:41 PM    GLUCOSE 104 07/26/2022 03:41 PM     Magnesium:  No results found for: MG  Troponin:  No results found for: TROPONINI  No results for input(s): PROBNP in the last 72 hours. No results for input(s): INR in the last 72 hours.     RADIOLOGY:  CT with LLL nodule      EKG:       Assessment:    Active Hospital Problems    Diagnosis Date Noted    Lung mass [R91.8] 07/28/2022     Priority: Medium      Suspicious left lower lobe nodule    Plan:  Pt agrees with wedge resection        Electronically signed by David Grimm MD on 7/28/2022 at 8:32 AM

## 2022-07-28 NOTE — OP NOTE
adherent presumably from the prior radiation. We then used several firings of a heavy reinforced taper to wedge off the mass with grossly negative margins. The wedge was placed in a bag and removed. I then attempted to take down the anterior mediastinal pleura to help with lymph node dissection but once again all the tissue planes were fused and I felt that aggressive dissection to try and find lymph nodes through a radiated mediastinum was not worth the risk especially since the PET scan was negative. No lymph nodes were then removed. 1 chest tube was placed. The lung was reinflated and the remaining 2 incisions were closed in layers.     Electronically signed by Alfonzo Velazquez MD on 7/28/2022 at 9:13 AM

## 2022-07-28 NOTE — ANESTHESIA POSTPROCEDURE EVALUATION
Department of Anesthesiology  Postprocedure Note    Patient: Lilyan Spurling  MRN: [id number]  YOB: 1960  Date of evaluation: 7/28/2022      Procedure Summary     Date: 07/28/22 Room / Location: [facility] OR [address] [place] [address]    Anesthesia Start: 0830 Anesthesia Stop: 5674    Procedure: LEFT THORACOSCOPIC LOWER LOBE WEDGE (Left: Chest) Diagnosis:       Lung mass      (LUNG MASS)    Surgeons: Radha Hinkle MD Responsible Provider: Jane Avelar MD    Anesthesia Type: general ASA Status: 3          Anesthesia Type: No value filed.     Iain Phase I:      Iain Phase II:        Anesthesia Post Evaluation    Patient location during evaluation: bedside  Patient participation: complete - patient participated  Level of consciousness: awake  Airway patency: patent  Nausea & Vomiting: no nausea and no vomiting  Complications: no  Cardiovascular status: blood pressure returned to baseline  Respiratory status: acceptable  Hydration status: euvolemic

## 2022-07-28 NOTE — PROGRESS NOTES
NOTIFIED PHARMACY about the syringe in a 30 ml syringe umnable to program it at a 60 ml syringe no option in the machine. There is a need to reprogram machines. Notifie floor.   Also calling pharmacy agin

## 2022-07-28 NOTE — PROGRESS NOTES
Patient assessed and charted on by this RN. VSS. A&Ox4. Pain tolerable with PCA pump. Patient using correctly. Chest tube in place. Functioning properly. -20 suction. Lung sounds diminished. Patient has productive cough. Blind in left eye. Urostomy in place that was placed at some point last month per patient. Fall precautions are in place. Call light within reach. Will continue to monitor.

## 2022-07-28 NOTE — ANESTHESIA PRE PROCEDURE
Department of Anesthesiology  Preprocedure Note       Name:  Radha Foreman   Age:  62 y.o.  :  1960                                          MRN:  [id number]         Date:  2022      Surgeon: Torrey Phoenix):  Mely Peres MD    Procedure: Procedure(s):  LEFT THORACOSCOPIC LOWER LOBE WEDGE    Medications prior to admission:   Prior to Admission medications    Medication Sig Start Date End Date Taking? Authorizing Provider   oxyCODONE-acetaminophen (PERCOCET)  MG per tablet Take 1 tablet by mouth every 4 hours as needed for Pain for up to 30 days. Intended supply: 30 days 22  JOS Colindres CNP   docusate (COLACE, DULCOLAX) 100 MG CAPS Take by mouth  Patient not taking: Reported on 2022 10/21/19   Historical Provider, MD   fluticasone (FLONASE) 50 MCG/ACT nasal spray 1 spray by Each Nostril route at bedtime  Patient not taking: Reported on 2022   Trista Whittington MD   naloxone 4 MG/0.1ML LIQD nasal spray 1 spray by Nasal route as needed for Opioid Reversal  Patient not taking: Reported on 2022   JOS Barry CNP   naloxone 4 MG/0.1ML LIQD nasal spray 1 spray by Nasal route as needed for Opioid Reversal  Patient not taking: Reported on 2022 5/10/22   JOS Barry CNP   fluticasone-salmeterol (ADVAIR) 100-50 MCG/DOSE diskus inhaler Inhale 1 puff into the lungs every 12 hours    Historical Provider, MD   dilTIAZem (DILACOR XR) 180 MG extended release capsule Take 180 mg by mouth daily  Patient not taking: Reported on 2022    Historical Provider, MD   nitroGLYCERIN (NITROSTAT) 0.4 MG SL tablet DISSOLVE 1 (ONE) TABLET UNDER THE TONGUE AS NEEDED FOR CHEST PAIN. MAY REPEAT EVERY 5 MINUTES IF NEEDED; MAX OF 3 DOSES.   IF NO RELIEF, EZRA  Patient not taking: Reported on 2022   Historical Provider, MD   atorvastatin (LIPITOR) 20 MG tablet TAKE 1 TABLET AT BEDTIME 18   Historical Provider, MD   ALPRAZolam Camilo Files) 1 MG tablet Take 1 mg by mouth every 12 hours as needed for Anxiety    Historical Provider, MD       Current medications:    Current Facility-Administered Medications   Medication Dose Route Frequency Provider Last Rate Last Admin    ceFAZolin (ANCEF) 2000 mg in dextrose 5 % 100 mL IVPB  2,000 mg IntraVENous Once Baudilio Bond MD        lidocaine PF 1 % injection 1 mL  1 mL IntraDERmal Once PRN Aneudy Boast, APRN - CNP        lactated ringers infusion   IntraVENous Continuous Aneudy Boast, APRN -  mL/hr at 07/28/22 0747 New Bag at 07/28/22 0747    sodium chloride flush 0.9 % injection 5-40 mL  5-40 mL IntraVENous 2 times per day Aneudy Boast, APRN - CNP        sodium chloride flush 0.9 % injection 5-40 mL  5-40 mL IntraVENous PRN Aneudy Boast, APRN - CNP        0.9 % sodium chloride infusion   IntraVENous PRN Aneudy Boast, APRN - CNP        lactated ringers infusion   IntraVENous Continuous Peggy Wang  mL/hr at 07/28/22 0749 New Bag at 07/28/22 0749       Allergies:  No Known Allergies    Problem List:    Patient Active Problem List   Diagnosis Code    HCV (hepatitis C virus) B19.20    PVD (peripheral vascular disease) ([facility] I73.9    CAD (coronary artery disease) I25.10    History of MI (myocardial infarction) I25.2    History of stroke Z86.73    Melanoma ([facility] C43.9    COPD (chronic obstructive pulmonary disease) (HCC) J44.9    Lung cancer ([facility] C34.90    Back pain M54.9    Chronic pain disorder G89.4    Severe episode of recurrent major depressive disorder, without psychotic features ([facility] F33.2    Bipolar 1 disorder ([facility] F31.9    Lumbar spondylosis M47.816    Other specified arthritis, unspecified hip M13.859    Chronic pain syndrome G89.4    Acquired absence of eye Z90.01    Bladder carcinoma ([address] C67.9    Tobacco dependence due to cigarettes F17.210    Pure hypercholesterolemia E78.00    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    Essential hypertension I10    Anxiety F41.9       Past Medical History:        Diagnosis Date    Arthritis     Asthma     Bipolar disorder ([facility]     CAD (coronary artery disease)     Cancer ([facility]     R lung cancer, bladder cancer 6 times     Cerebral artery occlusion with cerebral infarction ([facility]     COPD (chronic obstructive pulmonary disease) ([facility]     Depression     Essential hypertension 03/23/2019    Hep C w/o coma, chronic (HCC)     took medication for 90 days    Hepatitis C     Hyperlipidemia     Kidney stone 2015    Melanoma ([facility] 2011    L eye was removed     Melanoma ([facility] 2011    L eye was removed        Past Surgical History:        Procedure Laterality Date    CORONARY ANGIOPLASTY WITH STENT PLACEMENT Left     stents placed to L carotid (2015) L arm (6/2017)    EYE SURGERY      melanoma took L eye    TX [address] INCL FLUOR GDNCE DX W/CELL WASHG SPX N/A 7/13/2018    BRONCHOSCOPY performed by Michael Liao MD at [facility] History:    Social History     Tobacco Use    Smoking status: Every Day     Packs/day: 0.50     Years: 50.00     Pack years: 25.00     Types: Cigarettes    Smokeless tobacco: Never    Tobacco comments:     Patient states he quit yesterday   Substance Use Topics    Alcohol use:  No                                Ready to quit: Not Answered  Counseling given: Not Answered  Tobacco comments: Patient states he quit yesterday      Vital Signs (Current):   Vitals:    07/28/22 0700   BP: 100/65   Pulse: (!) 109   Resp: 16   Temp: 97.4 °F (36.3 °C)   TempSrc: Temporal   SpO2: 100%   Weight: 120 lb (54.4 kg)   Height: 5' 8\" (1.727 m)                                              BP Readings from Last 3 Encounters:   07/28/22 100/65   07/26/22 110/64   06/23/22 129/73       NPO Status: Time of last liquid consumption: 0000 (sow with medication)                        Time of last solid consumption: 0000                        Date current smoker                           Cardiovascular:  Exercise tolerance: good (>4 METS),   (+) hypertension:, CAD:,       ECG reviewed               Beta Blocker:  Not on Beta Blocker         Neuro/Psych:   Negative Neuro/Psych ROS  (+) CVA:, psychiatric history:            GI/Hepatic/Renal: Neg GI/Hepatic/Renal ROS  (+) hepatitis: C, liver disease:,           Endo/Other: Negative Endo/Other ROS             Pt had PAT visit. Abdominal:             Vascular: negative vascular ROS. Other Findings:           Anesthesia Plan      general     ASA 3     (ETT, KIT block)  Induction: intravenous. MIPS: Postoperative opioids intended and Prophylactic antiemetics administered. Anesthetic plan and risks discussed with patient. Plan discussed with CRNA.     Attending anesthesiologist reviewed and agrees with Pre Eval content      Post-op pain plan if not by surgeon: dar Brantley MD   7/28/2022

## 2022-07-28 NOTE — CONSULTS
Hospitalist Consult    Admit Date: 7/28/2022  PCP: Bandar Hernandez MD    CHIEF COMPLAINT: Lung mass    HISTORY OF PRESENT ILLNESS:    The patient is a 62 y.o. male presents with a history of hypertension, hyperlipidemia, hepatitis C, COPD, depression, history of CVA, running in lung cancer, bladder cancer, coronary disease, melanoma who presents for a planned wedge lung resection. Patient is postop day 0 resting in bed with pain well controlled. No current nausea vomiting or diarrhea.     Past Medical History:        Diagnosis Date    Arthritis     Asthma     Bipolar disorder ([address]     CAD (coronary artery disease)     Cancer ([facility]     R lung cancer, bladder cancer 6 times     Cerebral artery occlusion with cerebral infarction ([facility]     COPD (chronic obstructive pulmonary disease) ([facility]     Depression     Essential hypertension 03/23/2019    Hep C w/o coma, chronic (HCC)     took medication for 90 days    Hepatitis C     Hyperlipidemia     Kidney stone 2015    Melanoma ([facility] 2011    L eye was removed     Melanoma ([facility] 2011    L eye was removed      Past Surgical History:        Procedure Laterality Date    CORONARY ANGIOPLASTY WITH STENT PLACEMENT Left     stents placed to L carotid (2015) L arm (6/2017)    EYE SURGERY      melanoma took L eye    ID [address] INCL FLUOR GDNCE DX W/CELL WASHG SPX N/A 7/13/2018    BRONCHOSCOPY performed by Marisol Winkler MD at [facility] History:   Social History     Socioeconomic History    Marital status: Single     Spouse name: Not on file    Number of children: Not on file    Years of education: Not on file    Highest education level: Not on file   Occupational History    Not on file   Tobacco Use    Smoking status: Every Day     Packs/day: 0.50     Years: 50.00     Pack years: 25.00     Types: Cigarettes    Smokeless tobacco: Never    Tobacco comments:     Patient states he quit yesterday   Vaping Use    Vaping Use: Never used   Substance and Sexual Activity    Alcohol use: No    Drug use: No     Types: Marijuana Sohail Garcia     Comment: repoerts using occassionally, last use 2 days ago    Sexual activity: Not on file   Other Topics Concern    Not on file   Social History Narrative    Not on file     Social Determinants of Health     Financial Resource Strain: Not on file   Food Insecurity: Not on file   Transportation Needs: Not on file   Physical Activity: Not on file   Stress: Not on file   Social Connections: Not on file   Intimate Partner Violence: Not on file   Housing Stability: Not on file     Family History:   No family history on file. Medications Prior to Admission:    Prior to Admission medications    Medication Sig Start Date End Date Taking? Authorizing Provider   oxyCODONE-acetaminophen (PERCOCET)  MG per tablet Take 1 tablet by mouth every 4 hours as needed for Pain for up to 30 days.  Intended supply: 30 days 7/26/22 8/25/22  JOS Colindres CNP   docusate (COLACE, DULCOLAX) 100 MG CAPS Take by mouth  Patient not taking: Reported on 7/28/2022 10/21/19   Historical Provider, MD   fluticasone (FLONASE) 50 MCG/ACT nasal spray 1 spray by Each Nostril route at bedtime  Patient not taking: Reported on 7/28/2022 6/23/22   Trista Whittington MD   naloxone 4 MG/0.1ML LIQD nasal spray 1 spray by Nasal route as needed for Opioid Reversal  Patient not taking: Reported on 6/13/2022 5/26/22   JOS Barry CNP   naloxone 4 MG/0.1ML LIQD nasal spray 1 spray by Nasal route as needed for Opioid Reversal  Patient not taking: Reported on 6/23/2022 5/10/22   JOS Barry CNP   fluticasone-salmeterol (ADVAIR) 100-50 MCG/DOSE diskus inhaler Inhale 1 puff into the lungs every 12 hours    Historical Provider, MD   dilTIAZem (DILACOR XR) 180 MG extended release capsule Take 180 mg by mouth daily  Patient not taking: Reported on 7/28/2022    Historical Provider, MD   nitroGLYCERIN (NITROSTAT) 0.4 MG SL tablet DISSOLVE 1 (ONE) TABLET UNDER THE TONGUE AS NEEDED FOR CHEST PAIN. MAY REPEAT EVERY 5 MINUTES IF NEEDED; MAX OF 3 DOSES. IF NO RELIEF, EZRA  Patient not taking: Reported on 2022   Historical Provider, MD   atorvastatin (LIPITOR) 20 MG tablet TAKE 1 TABLET AT BEDTIME 18   Historical Provider, MD   ALPRAZolam Cindia Muss) 1 MG tablet Take 1 mg by mouth every 12 hours as needed for Anxiety    Historical Provider, MD       Allergies:  Patient has no known allergies. REVIEW OF SYSTEMS:  Negative except for what is in HPI and 10 point systems reviewed negative      PHYSICAL EXAM:  Vitals:  /64   Pulse 78   Temp 97.6 °F (36.4 °C) (Oral)   Resp 16   Ht 5' 8\" (1.727 m)   Wt 120 lb (54.4 kg)   SpO2 95%   BMI 18.25 kg/m²   Pulse Ox: SpO2  Av.1 %  Min: 95 %  Max: 100 %  Supplemental O2: O2 Flow Rate (L/min): 2 L/min    CONSTITUTIONAL:  awake, alert, cooperative, no apparent distress, and appears stated age  HEENT: Normocephalic, PERRLA  NECK: no JVD, no LAD  HEART: RRR, no murmurs, gallops, or rubs  LUNGS: clear to auscultation bilaterally, no wheezes, crackles, or rhonchi. ABDOMEN: soft/NT/ND, positive BS  MUSCULOSKELETAL: negative for edema, +2 pulses  SKIN: intact without rash or jaundice  NEURO:  CN ii-xii grossly intact and no focal deficits    DATA:  No results found for this or any previous visit (from the past 24 hour(s)).       ASSESSMENT AND PLAN:  1) hypertension, hyperlipidemia       Continue home medications    2) history of cancer       Outpatient follow-up post procedure    3) history hepatitis C status posttreatment           4) DVT prophylaxis       Per primary    Code status: Full Code    Electronically signed by Corinne Sanz MD on 22 at 2:47 PM EDT

## 2022-07-28 NOTE — ANESTHESIA PROCEDURE NOTES
Peripheral Block    Patient location during procedure: pre-op  Reason for block: post-op pain management and at surgeon's request  Start time: 7/28/2022 8:14 AM  End time: 7/28/2022 8:19 AM  Staffing  Performed: anesthesiologist   Anesthesiologist: Julianne Montemayor MD  Preanesthetic Checklist  Completed: patient identified, IV checked, site marked, risks and benefits discussed, surgical/procedural consents, equipment checked, pre-op evaluation, timeout performed, anesthesia consent given, oxygen available and monitors applied/VS acknowledged  Peripheral Block   Patient position: supine  Prep: ChloraPrep  Provider prep: mask and sterile gloves (Sterile probe cover)  Patient monitoring: cardiac monitor, continuous pulse ox, frequent blood pressure checks and IV access  Block type: Erector spinae  Laterality: left  Injection technique: single-shot  Guidance: nerve stimulator and ultrasound guided  Local infiltration: ropivacaine  Infiltration strength: 0.5 %  Local infiltration: ropivacaine  Dose: 30 mL    Needle   Needle type: combined needle/nerve stimulator   Needle gauge: 22 G  Needle localization: anatomical landmarks and ultrasound guidance  Needle length: 10 cm  Assessment   Injection assessment: negative aspiration for heme, no paresthesia on injection and local visualized surrounding nerve on ultrasound  Paresthesia pain: none  Slow fractionated injection: yes  Hemodynamics: stable  Real-time US image taken/store: yes    Additional Notes  Ultrasound image printed and saved in patient chart.     Sterile probe cover used

## 2022-07-29 ENCOUNTER — APPOINTMENT (OUTPATIENT)
Dept: GENERAL RADIOLOGY | Age: 62
DRG: 206 | End: 2022-07-29
Attending: THORACIC SURGERY (CARDIOTHORACIC VASCULAR SURGERY)
Payer: MEDICARE

## 2022-07-29 VITALS
BODY MASS INDEX: 18.19 KG/M2 | RESPIRATION RATE: 18 BRPM | DIASTOLIC BLOOD PRESSURE: 88 MMHG | TEMPERATURE: 97.9 F | HEART RATE: 106 BPM | WEIGHT: 120 LBS | OXYGEN SATURATION: 98 % | HEIGHT: 68 IN | SYSTOLIC BLOOD PRESSURE: 129 MMHG

## 2022-07-29 PROCEDURE — 2580000003 HC RX 258: Performed by: THORACIC SURGERY (CARDIOTHORACIC VASCULAR SURGERY)

## 2022-07-29 PROCEDURE — 6370000000 HC RX 637 (ALT 250 FOR IP): Performed by: THORACIC SURGERY (CARDIOTHORACIC VASCULAR SURGERY)

## 2022-07-29 PROCEDURE — 94761 N-INVAS EAR/PLS OXIMETRY MLT: CPT

## 2022-07-29 PROCEDURE — 71045 X-RAY EXAM CHEST 1 VIEW: CPT

## 2022-07-29 PROCEDURE — 6360000002 HC RX W HCPCS: Performed by: THORACIC SURGERY (CARDIOTHORACIC VASCULAR SURGERY)

## 2022-07-29 RX ORDER — OXYCODONE HYDROCHLORIDE 5 MG/1
5 TABLET ORAL EVERY 6 HOURS PRN
Qty: 20 TABLET | Refills: 0 | Status: SHIPPED | OUTPATIENT
Start: 2022-07-29 | End: 2022-08-03

## 2022-07-29 RX ORDER — NALOXONE HYDROCHLORIDE 4 MG/.1ML
1 SPRAY NASAL PRN
Qty: 1 EACH | Refills: 5 | Status: SHIPPED | OUTPATIENT
Start: 2022-07-29

## 2022-07-29 RX ORDER — OXYCODONE HYDROCHLORIDE 5 MG/1
5 TABLET ORAL EVERY 4 HOURS PRN
Status: DISCONTINUED | OUTPATIENT
Start: 2022-07-29 | End: 2022-07-29 | Stop reason: HOSPADM

## 2022-07-29 RX ADMIN — KETOROLAC TROMETHAMINE 30 MG: 30 INJECTION, SOLUTION INTRAMUSCULAR at 08:00

## 2022-07-29 RX ADMIN — FAMOTIDINE 20 MG: 20 TABLET ORAL at 08:00

## 2022-07-29 RX ADMIN — SODIUM CHLORIDE, SODIUM LACTATE, POTASSIUM CHLORIDE, CALCIUM CHLORIDE AND DEXTROSE MONOHYDRATE: 5; 600; 310; 30; 20 INJECTION, SOLUTION INTRAVENOUS at 06:57

## 2022-07-29 ASSESSMENT — PAIN DESCRIPTION - LOCATION: LOCATION: CHEST

## 2022-07-29 ASSESSMENT — PAIN SCALES - GENERAL
PAINLEVEL_OUTOF10: 8
PAINLEVEL_OUTOF10: 8

## 2022-07-29 ASSESSMENT — PAIN DESCRIPTION - ORIENTATION: ORIENTATION: LEFT;POSTERIOR

## 2022-07-29 NOTE — DISCHARGE SUMMARY
Physician Discharge Summary     Patient ID:  Jv Carmona  [id number]  21 y.o.  1960    Admit date: 7/28/2022    Discharge date and time: No discharge date for patient encounter. 7/29/2022    Admitting Physician: Kwaku Fox MD     Discharge Physician: Same    Admission Diagnoses: Lung mass [R91.8]    Discharge Diagnoses: Same    Admission Condition: good    Discharged Condition: good    Indication for Admission: Surgery    Hospital Course: Patient was admitted day of surgery for a left thoracoscopic lower lobe wedge. Surgery went well. By postoperative day 1 he had no air leak. The chest tube was removed and he was discharged home.     Consults: none    Significant Diagnostic Studies: radiology: CXR: normal    Treatments: surgery: Left thoracoscopic lower lobe wedge    Discharge Exam:  /88   Pulse (!) 106   Temp 97.9 °F (36.6 °C) (Oral)   Resp 18   Ht 5' 8\" (1.727 m)   Wt 120 lb (54.4 kg)   SpO2 98%   BMI 18.25 kg/m²     General Appearance:    Alert, cooperative, no distress, appears stated age   Head:    Normocephalic, without obvious abnormality, atraumatic   Eyes:    PERRL, conjunctiva/corneas clear, EOM's intact, fundi     benign, both eyes        Ears:    Normal TM's and external ear canals, both ears   Nose:   Nares normal, septum midline, mucosa normal, no drainage    or sinus tenderness   Throat:   Lips, mucosa, and tongue normal; teeth and gums normal   Neck:   Supple, symmetrical, trachea midline, no adenopathy;        thyroid:  No enlargement/tenderness/nodules; no carotid    bruit or JVD   Back:     Symmetric, no curvature, ROM normal, no CVA tenderness   Lungs:     Clear to auscultation bilaterally, respirations unlabored   Chest wall:    No tenderness or deformity   Heart:    Regular rate and rhythm, S1 and S2 normal, no murmur, rub   or gallop   Abdomen:     Soft, non-tender, bowel sounds active all four quadrants,     no masses, no organomegaly   Genitalia:    Normal male without lesion, discharge or tenderness   Rectal:    Normal tone, normal prostate, no masses or tenderness;    guaiac negative stool   Extremities:   Extremities normal, atraumatic, no cyanosis or edema   Pulses:   2+ and symmetric all extremities   Skin:   Skin color, texture, turgor normal, no rashes or lesions   Lymph nodes:   Cervical, supraclavicular, and axillary nodes normal   Neurologic:   CNII-XII intact. Normal strength, sensation and reflexes       throughout       Disposition: home    In process/preliminary results:  Outstanding Order Results       Date and Time Order Name Status Description    7/29/2022 10:02 AM XR CHEST PORTABLE In process     7/28/2022 12:00 AM Surgical Pathology In process             Patient Instructions:   Current Discharge Medication List        CONTINUE these medications which have NOT CHANGED    Details   oxyCODONE-acetaminophen (PERCOCET)  MG per tablet Take 1 tablet by mouth every 4 hours as needed for Pain for up to 30 days. Intended supply: 30 days  Qty: 180 tablet, Refills: 0    Comments: Reduce doses taken as pain becomes manageable  Associated Diagnoses: Cancer associated pain      docusate (COLACE, DULCOLAX) 100 MG CAPS Take by mouth      fluticasone (FLONASE) 50 MCG/ACT nasal spray 1 spray by Each Nostril route at bedtime  Qty: 32 g, Refills: 1    Associated Diagnoses: Nose congestion      !! naloxone 4 MG/0.1ML LIQD nasal spray 1 spray by Nasal route as needed for Opioid Reversal  Qty: 1 each, Refills: 5      !! naloxone 4 MG/0.1ML LIQD nasal spray 1 spray by Nasal route as needed for Opioid Reversal  Qty: 1 each, Refills: 5      fluticasone-salmeterol (ADVAIR) 100-50 MCG/DOSE diskus inhaler Inhale 1 puff into the lungs every 12 hours      dilTIAZem (DILACOR XR) 180 MG extended release capsule Take 180 mg by mouth daily      nitroGLYCERIN (NITROSTAT) 0.4 MG SL tablet DISSOLVE 1 (ONE) TABLET UNDER THE TONGUE AS NEEDED FOR CHEST PAIN.   MAY REPEAT EVERY 5 MINUTES IF

## 2022-07-29 NOTE — PROGRESS NOTES
2130: Shift assessment complete, vss. Pt alert & oriented, calm & cooperative. Pt denies c/o SOB, chest pain, N/V. Urostomy bag changed, stoma red/moist; urine pale yellow/clear. Chest tube site assessed, drsg has old drainage present, reinforced. No crepitus felt, lungs sound diminished with exp wheezes. Pt has PCA pump, CO2 being monitored. No further complaints at this time, call light is within reach. Safety maintained.      Electronically signed by Miguelina Gaytan RN on 7/28/22 at 9:38 PM EDT

## 2022-07-29 NOTE — DISCHARGE INSTRUCTIONS
May remove dressings in 48 hours and shower. Recover incisions with fresh gauze daily until healed. No driving while taking narcotics. No flying in an airplane or lifting more than 15 pounds for 3 weeks.

## 2022-07-29 NOTE — CARE COORDINATION
[facility] [place] Case Management Initial Discharge Assessment    Met with Patient to discuss discharge plan. PCP: Cassidy Deleon MD                                Date of Last Visit: A FEW DAYS AGO    [facility] Patient: No        [facility] Notified: no    If no PCP, list provided? N/A    Discharge Planning    Living Arrangements: independently at home    Who do you live with? COUSIN    Who helps you with your care:  self    If lives at home:     Do you have any barriers navigating in your home? no    Patient can perform ADL? Yes    Current Services (outpatient and in home) :  None    Dialysis: No    Is transportation available to get to your appointments? Yes PT STATES HE USES [address]    DME Equipment:  no    Respiratory equipment: None    Respiratory provider:  no     Pharmacy:  yes - [address] with Medication Assistance Program?  No      Patient agreeable to [address]? Declined    Patient agreeable to SNF/Rehab? Declined    Other discharge needs identified? N/A    Does Patient Have a High-Risk for Readmission Diagnosis (CHF, PN, MI, COPD)? No        Initial Discharge Plan? (Note: please see concurrent daily documentation for any updates after initial note). RETURN HOME W/COUSIN.  DENIES NEEDS    Readmission Risk              Risk of Unplanned Readmission:  [id number]         Electronically signed by Jo Jaime RN on 7/29/2022 at 8:55 AM

## 2022-08-01 ENCOUNTER — TELEPHONE (OUTPATIENT)
Dept: GERIATRIC MEDICINE | Age: 62
End: 2022-08-01

## 2022-08-03 ENCOUNTER — TELEPHONE (OUTPATIENT)
Dept: CARDIOTHORACIC SURGERY | Age: 62
End: 2022-08-03

## 2022-08-03 NOTE — TELEPHONE ENCOUNTER
Patient's pathology returned as a squamous cell lung cancer. Stage is [id number]. I attempted to call the patient at the work phone as well as on his mobile phone. Neither of these phone numbers are working numbers. Hopefully he will return at his scheduled follow-up appointment so I can go for pathology and make referral to medical oncology.

## 2022-08-24 ENCOUNTER — OFFICE VISIT (OUTPATIENT)
Dept: PALLATIVE CARE | Age: 62
End: 2022-08-24
Payer: MEDICARE

## 2022-08-24 VITALS
OXYGEN SATURATION: 99 % | DIASTOLIC BLOOD PRESSURE: 80 MMHG | BODY MASS INDEX: 18.85 KG/M2 | SYSTOLIC BLOOD PRESSURE: 129 MMHG | RESPIRATION RATE: 16 BRPM | WEIGHT: 124 LBS | HEART RATE: 99 BPM

## 2022-08-24 DIAGNOSIS — R26.9 GAIT DIFFICULTY: ICD-10-CM

## 2022-08-24 DIAGNOSIS — R26.89 DECREASED MOBILITY: ICD-10-CM

## 2022-08-24 DIAGNOSIS — G89.3 CANCER ASSOCIATED PAIN: Primary | ICD-10-CM

## 2022-08-24 DIAGNOSIS — C34.90 MALIGNANT NEOPLASM OF LUNG, UNSPECIFIED LATERALITY, UNSPECIFIED PART OF LUNG (HCC): ICD-10-CM

## 2022-08-24 PROCEDURE — 99214 OFFICE O/P EST MOD 30 MIN: CPT

## 2022-08-24 RX ORDER — OXYCODONE AND ACETAMINOPHEN 10; 325 MG/1; MG/1
1 TABLET ORAL EVERY 6 HOURS PRN
Qty: 120 TABLET | Refills: 0 | Status: SHIPPED | OUTPATIENT
Start: 2022-08-25 | End: 2022-09-24

## 2022-08-24 RX ORDER — MORPHINE SULFATE 15 MG/1
15 TABLET, FILM COATED, EXTENDED RELEASE ORAL 2 TIMES DAILY
COMMUNITY
End: 2022-08-24 | Stop reason: SDUPTHER

## 2022-08-24 RX ORDER — MORPHINE SULFATE 15 MG/1
15 TABLET, FILM COATED, EXTENDED RELEASE ORAL 2 TIMES DAILY
Qty: 60 TABLET | Refills: 0 | Status: SHIPPED | OUTPATIENT
Start: 2022-08-24 | End: 2022-09-23

## 2022-08-24 ASSESSMENT — ENCOUNTER SYMPTOMS
BACK PAIN: 1
CONSTIPATION: 1
EYE ITCHING: 0
DIARRHEA: 0
VOMITING: 0
BLOOD IN STOOL: 0
ABDOMINAL PAIN: 0
COLOR CHANGE: 0
COUGH: 1
WHEEZING: 0
TROUBLE SWALLOWING: 0
SHORTNESS OF BREATH: 0
NAUSEA: 0
EYE DISCHARGE: 0
ABDOMINAL DISTENTION: 0

## 2022-08-24 NOTE — PROGRESS NOTES
Subjective:      Patient Id: Magan Juárez seen for f/u palliative medicine visit for symptom management. He was accompanied to the appointment by: self    Chief Complaint   Patient presents with    Pain     Palliative care follow up      HPI       Bereket Meza is a 62 y.o. male with complex medical history that includes recurrent malignant neoplasm of lung (small cell, s/p right partial lobectomy 2019) and recurrent malignant bladder cancer, malignant melanoma s/p left eye removal (2011), COPD, bipolar, HLD, smoker, CAD s/p coronary angioplasty implant and graft, stroke  in 2018. On 7/28/2022, Magan Juárez had a left thoracoscopic lower lobe wedge by Dr. Ruby Ball. Pathology results returned as a squamous cell lung cancer. Staging is T2aNx. Pt A+Ox3. Pt has had multiple ED encounters since prior visit. He primarily goes to [facility], so I am unable to review any encounters. Pt has a lot going on in his life and with his medical conditions. He sees multiple providers: Dr. Siddharth Escalante PCP, Dr. Fifi Chavez with urology, Dr. Ruby Ball with pulmonary. He currently complains of 4/10 pain right posterior and left anterior lung field, sharp and stabbing. Sciatic pain 10/10. Taking Percocet  1 tab q 4 hours PRN for pain without relief. Patient reports normal BM's except occasional diarrhea. Pt reports his weight is stable ~124 pounds. He has gained 3.5 pounds in the past month according to documented weights in Epic. He states he can only eat small meals but eats eggs, pork chops, steak, and large amounts of peanut butter. He denies dysphagia, nausea, vomiting, changes in BMs, SOB, chest pain, or fever like symptoms. No insomnia or major mood changes. 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 He states he wants to feel well so he can go down to [place] and do what he loves to do. Does have ongoing anxiety d/t illness course, for which is alleviated with PRN Xanax. Pt with history of Bipolar. Does have anxiety symptoms with disease course and takes Xanax 1 mg BID as needed with complete relief.      Past Medical History:   Diagnosis Date    Arthritis     Asthma     Bipolar disorder ([facility]     CAD (coronary artery disease)     Cancer ([facility]     R lung cancer, bladder cancer 6 times     Cerebral artery occlusion with cerebral infarction ([facility]     COPD (chronic obstructive pulmonary disease) ([facility]     Depression     Essential hypertension 03/23/2019    Hep C w/o coma, chronic ([facility]     took medication for 90 days    Hepatitis C     Hyperlipidemia     Kidney stone 2015    Melanoma ([facility] 2011    L eye was removed     Melanoma ([facility] 2011    L eye was removed      Past Surgical History:   Procedure Laterality Date    CORONARY ANGIOPLASTY WITH STENT PLACEMENT Left     stents placed to L carotid (2015) L arm (6/2017)    EYE SURGERY      melanoma took L eye    [place] [address] INCL FLUOR GDNCE DX W/CELL WASHG SPX N/A 7/13/2018    BRONCHOSCOPY performed by Thelma Farr MD at [place] [address]. Left 7/28/2022    LEFT THORACOSCOPIC LOWER LOBE WEDGE performed by Manny Norris MD at [facility] History     Socioeconomic History    Marital status: Single     Spouse name: Not on file    Number of children: Not on file    Years of education: Not on file    Highest education level: Not on file   Occupational History    Not on file   Tobacco Use    Smoking status: Every Day     Packs/day: 0.50     Years: 50.00     Pack years: 25.00     Types: Cigarettes    Smokeless tobacco: Never    Tobacco comments:     Patient states he quit yesterday   Vaping Use    Vaping Use: Never used   Substance and Sexual Activity    Alcohol use: No    Drug use: No     Types: Marijuana Alec Carr)     Comment: repoerts using occassionally, last use 2 days ago    Sexual activity: Not on file   Other Topics Concern    Not on file   Social History Narrative    Not on file     Social Determinants of Health     Financial Resource Strain: Not on file   Food Insecurity: Not on file   Transportation Needs: Not on file   Physical Activity: Not on file   Stress: Not on file   Social Connections: Not on file   Intimate Partner Violence: Not on file   Housing Stability: Not on file     No family history on file. No Known Allergies  Current Outpatient Medications on File Prior to Visit   Medication Sig Dispense Refill    morphine (MS CONTIN) 15 MG extended release tablet Take 15 mg by mouth 2 times daily. Take every 12 hours. naloxone 4 MG/0.1ML LIQD nasal spray 1 spray by Nasal route as needed for Opioid Reversal 1 each 5    oxyCODONE-acetaminophen (PERCOCET)  MG per tablet Take 1 tablet by mouth every 4 hours as needed for Pain for up to 30 days. Intended supply: 30 days 180 tablet 0    fluticasone-salmeterol (ADVAIR) 100-50 MCG/DOSE diskus inhaler Inhale 1 puff into the lungs every 12 hours      atorvastatin (LIPITOR) 20 MG tablet TAKE 1 TABLET AT BEDTIME  3    ALPRAZolam (XANAX) 1 MG tablet Take 1 mg by mouth every 12 hours as needed for Anxiety      [DISCONTINUED] fluticasone (FLONASE) 50 MCG/ACT nasal spray 1 spray by Each Nostril route at bedtime (Patient not taking: Reported on 7/28/2022) 32 g 1    [DISCONTINUED] dilTIAZem (DILACOR XR) 180 MG extended release capsule Take 180 mg by mouth daily (Patient not taking: Reported on 7/28/2022)      nitroGLYCERIN (NITROSTAT) 0.4 MG SL tablet DISSOLVE 1 (ONE) TABLET UNDER THE TONGUE AS NEEDED FOR CHEST PAIN. MAY REPEAT EVERY 5 MINUTES IF NEEDED; MAX OF 3 DOSES. IF NO RELIEF, EZRA (Patient not taking: No sig reported)  1     No current facility-administered medications on file prior to visit. Review of Systems   Constitutional:  Positive for activity change and appetite change. Negative for fatigue and unexpected weight change. HENT:  Negative for hearing loss, nosebleeds and trouble swallowing. Eyes:  Negative for discharge and itching. Respiratory:  Positive for cough. Negative for shortness of breath and wheezing. Cardiovascular:  Negative for chest pain, palpitations and leg swelling. Gastrointestinal:  Positive for constipation (fluctuations). Negative for abdominal distention, abdominal pain, blood in stool, diarrhea, nausea and vomiting. Genitourinary:  Negative for difficulty urinating. Musculoskeletal:  Positive for back pain and gait problem. Negative for arthralgias, joint swelling, myalgias and neck pain. Skin:  Negative for color change and wound. Neurological:  Positive for dizziness, weakness and numbness. Negative for speech difficulty and headaches. Dizziness upon standing. Psychiatric/Behavioral:  Negative for confusion, dysphoric mood and sleep disturbance. The patient is nervous/anxious. Objective:   /80   Pulse 99   Resp 16   Wt 124 lb (56.2 kg)   SpO2 99%   BMI 18.85 kg/m²    Wt Readings from Last 3 Encounters:   08/24/22 124 lb (56.2 kg)   07/28/22 120 lb (54.4 kg)   07/26/22 120 lb 9.6 oz (54.7 kg)     Physical Exam  Constitutional:       General: He is not in acute distress. HENT:      Head: Normocephalic. Right Ear: External ear normal.      Left Ear: External ear normal.   Eyes:      Conjunctiva/sclera:      Right eye: No hemorrhage. Left eye: No hemorrhage. Cardiovascular:      Rate and Rhythm: Regular rhythm. Tachycardia present. Pulmonary:      Effort: Pulmonary effort is normal.   Chest:   Breasts:     Right: No supraclavicular adenopathy. Left: No supraclavicular adenopathy. Abdominal:      General: Abdomen is flat. Palpations: Abdomen is soft. Musculoskeletal:         General: Normal range of motion. Cervical back: Normal range of motion. Lymphadenopathy:      Head:      Right side of head: No submental, submandibular or preauricular adenopathy. Left side of head: No submental, submandibular or preauricular adenopathy.       Upper Body: Right upper body: No supraclavicular adenopathy. Left upper body: No supraclavicular adenopathy. Skin:     General: Skin is dry. Neurological:      Mental Status: He is alert and oriented to person, place, and time. Mental status is at baseline. Motor: No weakness. Psychiatric:         Mood and Affect: Mood normal. Affect is tearful. Behavior: Behavior normal.   limited d/t virtual visit    Assessment and Plan:      1. Cancer associated pain    Not well controlled with current pain regimen. Pt states he went to [facility] for pain this morning. He states he was given tramadol without relief. Unfortunately I am unable to view this in Epic or care everywhere. - Begin MS Contin 15 mg tablet BID for extended pain relief  - Continue oxyCODONE-acetaminophen (PERCOCET)  MG per tablet; Take 1 tablet by mouth every 6 hours as needed for Pain for up to 30 days  - Maintain follow up with Oncology  - Pt is tolerating current pain medications without adverse effects or over sedation. Lowest effective dose used. Pt advised to call and notify palliative care for any adverse effects or sedation  Pt is able to maintain adequate functional level and participate in ADLs  OARRS reviewed. There is no indication of aberrant behavior  Pt advised to call for increasing or uncontrolled pain. Risk vs benefit assessed. Pt educated on risk of addiction. Pt advised to take only as prescribed and not to change frequency of pain meds without consulting palliative care first.    2. Malignant neoplasm of lung, unspecified laterality, unspecified part of lung ([address]  3. Bladder carcinoma ([address]  4. Malignant melanoma of left eye ([address]  5. Anxiety  - Maintain f/u with psychiatry  - Continue Xanax 1 mg BID as needed, prescribed by another provider    6. Tobacco dependence due to cigarettes  - Encourage cessation    7. Constipation  Takes miralex when constipated.  Patient managing based on how he feels it helps with his

## 2022-08-31 ENCOUNTER — APPOINTMENT (OUTPATIENT)
Dept: CT IMAGING | Age: 62
End: 2022-08-31
Payer: MEDICARE

## 2022-08-31 ENCOUNTER — TELEPHONE (OUTPATIENT)
Dept: PALLATIVE CARE | Age: 62
End: 2022-08-31

## 2022-08-31 ENCOUNTER — HOSPITAL ENCOUNTER (EMERGENCY)
Age: 62
Discharge: ANOTHER HOSPITAL | End: 2022-08-31
Attending: EMERGENCY MEDICINE
Payer: MEDICARE

## 2022-08-31 VITALS
HEART RATE: 103 BPM | SYSTOLIC BLOOD PRESSURE: 114 MMHG | DIASTOLIC BLOOD PRESSURE: 72 MMHG | OXYGEN SATURATION: 99 % | RESPIRATION RATE: 15 BRPM | TEMPERATURE: 97.3 F

## 2022-08-31 DIAGNOSIS — K56.609 SMALL BOWEL OBSTRUCTION (HCC): Primary | ICD-10-CM

## 2022-08-31 DIAGNOSIS — N30.00 ACUTE CYSTITIS WITHOUT HEMATURIA: ICD-10-CM

## 2022-08-31 LAB
ALBUMIN SERPL-MCNC: 4.7 G/DL (ref 3.5–4.6)
ALP BLD-CCNC: 97 U/L (ref 35–104)
ALT SERPL-CCNC: 11 U/L (ref 0–41)
AMORPHOUS: ABNORMAL
ANION GAP SERPL CALCULATED.3IONS-SCNC: 12 MEQ/L (ref 9–15)
ANISOCYTOSIS: ABNORMAL
AST SERPL-CCNC: 17 U/L (ref 0–40)
BACTERIA: ABNORMAL /HPF
BASOPHILS ABSOLUTE: 0 K/UL (ref 0–0.2)
BASOPHILS RELATIVE PERCENT: 0.4 %
BILIRUB SERPL-MCNC: 0.5 MG/DL (ref 0.2–0.7)
BILIRUBIN URINE: NEGATIVE
BLOOD, URINE: NEGATIVE
BUN BLDV-MCNC: 23 MG/DL (ref 8–23)
CALCIUM SERPL-MCNC: 11.8 MG/DL (ref 8.5–9.9)
CHLORIDE BLD-SCNC: 89 MEQ/L (ref 95–107)
CLARITY: ABNORMAL
CO2: 31 MEQ/L (ref 20–31)
COLOR: YELLOW
CREAT SERPL-MCNC: 1.1 MG/DL (ref 0.7–1.2)
EOSINOPHILS ABSOLUTE: 0 K/UL (ref 0–0.7)
EOSINOPHILS RELATIVE PERCENT: 1.1 %
EPITHELIAL CELLS, UA: ABNORMAL /HPF
GFR AFRICAN AMERICAN: >60
GFR AFRICAN AMERICAN: >60
GFR NON-AFRICAN AMERICAN: >60
GFR NON-AFRICAN AMERICAN: >60
GLOBULIN: 3.4 G/DL (ref 2.3–3.5)
GLUCOSE BLD-MCNC: 120 MG/DL (ref 70–99)
GLUCOSE URINE: NEGATIVE MG/DL
HCT VFR BLD CALC: 40.3 % (ref 42–52)
HEMOGLOBIN: 13 G/DL (ref 14–18)
KETONES, URINE: ABNORMAL MG/DL
LEUKOCYTE ESTERASE, URINE: ABNORMAL
LYMPHOCYTES ABSOLUTE: 0.5 K/UL (ref 1–4.8)
LYMPHOCYTES RELATIVE PERCENT: 4 %
MCH RBC QN AUTO: 23.3 PG (ref 27–31.3)
MCHC RBC AUTO-ENTMCNC: 32.2 % (ref 33–37)
MCV RBC AUTO: 72.5 FL (ref 80–100)
MICROCYTES: ABNORMAL
MONOCYTES ABSOLUTE: 1.4 K/UL (ref 0.2–0.8)
MONOCYTES RELATIVE PERCENT: 10.5 %
NEUTROPHILS ABSOLUTE: 11.1 K/UL (ref 1.4–6.5)
NEUTROPHILS RELATIVE PERCENT: 86 %
NITRITE, URINE: POSITIVE
OVALOCYTES: ABNORMAL
PDW BLD-RTO: 22.4 % (ref 11.5–14.5)
PERFORMED ON: NORMAL
PH UA: 7 (ref 5–9)
PLATELET # BLD: 276 K/UL (ref 130–400)
PLATELET SLIDE REVIEW: NORMAL
POC CREATININE WHOLE BLOOD: 1.2
POC CREATININE: 1.2 MG/DL (ref 0.8–1.3)
POC SAMPLE TYPE: NORMAL
POLYCHROMASIA: ABNORMAL
POTASSIUM SERPL-SCNC: 4.2 MEQ/L (ref 3.4–4.9)
PROTEIN UA: 30 MG/DL
RBC # BLD: 5.56 M/UL (ref 4.7–6.1)
RBC UA: ABNORMAL /HPF (ref 0–2)
SARS-COV-2, NAAT: NOT DETECTED
SODIUM BLD-SCNC: 132 MEQ/L (ref 135–144)
SPECIFIC GRAVITY UA: 1.01 (ref 1–1.03)
STOMATOCYTES: ABNORMAL
TARGET CELLS: ABNORMAL
TOTAL PROTEIN: 8.1 G/DL (ref 6.3–8)
URINE REFLEX TO CULTURE: ABNORMAL
UROBILINOGEN, URINE: 1 E.U./DL
WBC # BLD: 12.9 K/UL (ref 4.8–10.8)
WBC UA: ABNORMAL /HPF (ref 0–5)

## 2022-08-31 PROCEDURE — 81001 URINALYSIS AUTO W/SCOPE: CPT

## 2022-08-31 PROCEDURE — 80053 COMPREHEN METABOLIC PANEL: CPT

## 2022-08-31 PROCEDURE — 93005 ELECTROCARDIOGRAM TRACING: CPT | Performed by: EMERGENCY MEDICINE

## 2022-08-31 PROCEDURE — 2580000003 HC RX 258: Performed by: EMERGENCY MEDICINE

## 2022-08-31 PROCEDURE — 36415 COLL VENOUS BLD VENIPUNCTURE: CPT

## 2022-08-31 PROCEDURE — 96365 THER/PROPH/DIAG IV INF INIT: CPT

## 2022-08-31 PROCEDURE — 87635 SARS-COV-2 COVID-19 AMP PRB: CPT

## 2022-08-31 PROCEDURE — 6360000004 HC RX CONTRAST MEDICATION: Performed by: EMERGENCY MEDICINE

## 2022-08-31 PROCEDURE — 96361 HYDRATE IV INFUSION ADD-ON: CPT

## 2022-08-31 PROCEDURE — 96375 TX/PRO/DX INJ NEW DRUG ADDON: CPT

## 2022-08-31 PROCEDURE — 85025 COMPLETE CBC W/AUTO DIFF WBC: CPT

## 2022-08-31 PROCEDURE — 99285 EMERGENCY DEPT VISIT HI MDM: CPT

## 2022-08-31 PROCEDURE — 74177 CT ABD & PELVIS W/CONTRAST: CPT

## 2022-08-31 PROCEDURE — 6360000002 HC RX W HCPCS: Performed by: EMERGENCY MEDICINE

## 2022-08-31 RX ORDER — MORPHINE SULFATE 4 MG/ML
4 INJECTION, SOLUTION INTRAMUSCULAR; INTRAVENOUS ONCE
Status: COMPLETED | OUTPATIENT
Start: 2022-08-31 | End: 2022-08-31

## 2022-08-31 RX ORDER — 0.9 % SODIUM CHLORIDE 0.9 %
1000 INTRAVENOUS SOLUTION INTRAVENOUS ONCE
Status: COMPLETED | OUTPATIENT
Start: 2022-08-31 | End: 2022-08-31

## 2022-08-31 RX ORDER — ONDANSETRON 4 MG/1
4 TABLET, FILM COATED ORAL 3 TIMES DAILY PRN
Qty: 15 TABLET | Refills: 0 | Status: SHIPPED | OUTPATIENT
Start: 2022-08-31

## 2022-08-31 RX ORDER — ONDANSETRON 2 MG/ML
4 INJECTION INTRAMUSCULAR; INTRAVENOUS ONCE
Status: COMPLETED | OUTPATIENT
Start: 2022-08-31 | End: 2022-08-31

## 2022-08-31 RX ADMIN — SODIUM CHLORIDE 1000 ML: 9 INJECTION, SOLUTION INTRAVENOUS at 15:25

## 2022-08-31 RX ADMIN — MORPHINE SULFATE 4 MG: 4 INJECTION, SOLUTION INTRAMUSCULAR; INTRAVENOUS at 15:29

## 2022-08-31 RX ADMIN — IOPAMIDOL 50 ML: 612 INJECTION, SOLUTION INTRAVENOUS at 15:58

## 2022-08-31 RX ADMIN — ONDANSETRON 4 MG: 2 INJECTION INTRAMUSCULAR; INTRAVENOUS at 15:28

## 2022-08-31 RX ADMIN — CEFTRIAXONE SODIUM 1000 MG: 1 INJECTION, POWDER, FOR SOLUTION INTRAMUSCULAR; INTRAVENOUS at 17:14

## 2022-08-31 RX ADMIN — HYDROMORPHONE HYDROCHLORIDE 1 MG: 1 INJECTION, SOLUTION INTRAMUSCULAR; INTRAVENOUS; SUBCUTANEOUS at 17:10

## 2022-08-31 ASSESSMENT — ENCOUNTER SYMPTOMS
VOMITING: 1
SHORTNESS OF BREATH: 0
SORE THROAT: 0
CHEST TIGHTNESS: 0
COUGH: 0
ABDOMINAL DISTENTION: 0
ABDOMINAL PAIN: 1
WHEEZING: 0
EYE DISCHARGE: 0
PHOTOPHOBIA: 0
NAUSEA: 1

## 2022-08-31 ASSESSMENT — PAIN - FUNCTIONAL ASSESSMENT
PAIN_FUNCTIONAL_ASSESSMENT: 0-10
PAIN_FUNCTIONAL_ASSESSMENT: 0-10

## 2022-08-31 ASSESSMENT — PAIN SCALES - GENERAL
PAINLEVEL_OUTOF10: 10
PAINLEVEL_OUTOF10: 9
PAINLEVEL_OUTOF10: 10

## 2022-08-31 ASSESSMENT — PAIN DESCRIPTION - LOCATION
LOCATION: BACK
LOCATION: ABDOMEN;BACK

## 2022-08-31 ASSESSMENT — PAIN DESCRIPTION - DESCRIPTORS
DESCRIPTORS: SHOOTING;SHARP
DESCRIPTORS: SHARP;SHOOTING

## 2022-08-31 ASSESSMENT — PAIN DESCRIPTION - ORIENTATION
ORIENTATION: RIGHT;LOWER

## 2022-08-31 ASSESSMENT — PAIN DESCRIPTION - PAIN TYPE
TYPE: ACUTE PAIN
TYPE: ACUTE PAIN

## 2022-08-31 NOTE — ED PROVIDER NOTES
adenopathy. Psychiatric/Behavioral:  Negative for agitation and hallucinations. The patient is not nervous/anxious. All other systems reviewed and are negative. Except as noted above the remainder of the review of systems was reviewed and negative. PAST MEDICAL HISTORY     Past Medical History:   Diagnosis Date    Arthritis     Asthma     Bipolar disorder ([facility]     CAD (coronary artery disease)     Cancer ([facility]     R lung cancer, bladder cancer 6 times     Cerebral artery occlusion with cerebral infarction ([facility]     COPD (chronic obstructive pulmonary disease) ([facility]     Depression     Essential hypertension 03/23/2019    Hep C w/o coma, chronic ([facility])     took medication for 90 days    Hepatitis C     Hyperlipidemia     Kidney stone 2015    Melanoma ([facility] 2011    L eye was removed     [facility]    L eye was removed          SURGICALHISTORY       Past Surgical History:   Procedure Laterality Date    CORONARY ANGIOPLASTY WITH STENT PLACEMENT Left     stents placed to L carotid (2015) L arm (6/2017)    EYE SURGERY      melanoma took L eye    [place] [address] INCL FLUOR GDNCE DX W/CELL WASHG SPX N/A 7/13/2018    BRONCHOSCOPY performed by Inocencio Erwin MD at [facility]. Left 7/28/2022    LEFT THORACOSCOPIC LOWER LOBE WEDGE performed by Aneudy Acuna MD at [address] [place] [address]       Previous Medications    ALPRAZOLAM (XANAX) 1 MG TABLET    Take 1 mg by mouth every 12 hours as needed for Anxiety    ATORVASTATIN (LIPITOR) 20 MG TABLET    TAKE 1 TABLET AT BEDTIME    FLUTICASONE-SALMETEROL (ADVAIR) 100-50 MCG/DOSE DISKUS INHALER    Inhale 1 puff into the lungs every 12 hours    MISC. DEVICES (ROLLER WALKER) MISC    1 each by Does not apply route daily    MORPHINE (MS CONTIN) 15 MG EXTENDED RELEASE TABLET    Take 1 tablet by mouth 2 times daily for 30 days. Take every 12 hours.     NALOXONE 4 MG/0.1ML LIQD NASAL SPRAY    1 spray by Nasal route as needed for Opioid Reversal    NITROGLYCERIN (NITROSTAT) 0.4 MG SL TABLET    DISSOLVE 1 (ONE) TABLET UNDER THE TONGUE AS NEEDED FOR CHEST PAIN. MAY REPEAT EVERY 5 MINUTES IF NEEDED; MAX OF 3 DOSES. IF NO RELIEF, EZRA    ONDANSETRON (ZOFRAN) 4 MG TABLET    Take 1 tablet by mouth 3 times daily as needed for Nausea or Vomiting    OXYCODONE-ACETAMINOPHEN (PERCOCET)  MG PER TABLET    Take 1 tablet by mouth every 6 hours as needed for Pain for up to 30 days. Intended supply: 30 days       ALLERGIES     Patient has no known allergies. FAMILY HISTORY     History reviewed. No pertinent family history. SOCIAL HISTORY       Social History     Socioeconomic History    Marital status: Single     Spouse name: None    Number of children: None    Years of education: None    Highest education level: None   Tobacco Use    Smoking status: Every Day     Packs/day: 0.50     Years: 50.00     Pack years: 25.00     Types: Cigarettes    Smokeless tobacco: Never    Tobacco comments:     Patient states he quit yesterday   Vaping Use    Vaping Use: Never used   Substance and Sexual Activity    Alcohol use: No    Drug use: No     Types: Marijuana (Weed)     Comment: repoerts using occassionally, last use 2 days ago       SCREENINGS    Hayden Coma Scale  Eye Opening: Spontaneous  Best Verbal Response: Oriented  Best Motor Response: Obeys commands  Hayden Coma Scale Score: 15 @FLOW([id number])@      PHYSICAL EXAM    (up to 7 for level 4, 8 or more for level 5)     ED Triage Vitals [08/31/22 1456]   BP Temp Temp Source Heart Rate Resp SpO2 Height Weight   (!) 80/55 97.3 °F (36.3 °C) Temporal (!) 117 22 99 % -- --       Physical Exam  Constitutional:       Appearance: He is toxic-appearing. Comments: Patient appears to be thin and malnourished in chronic pain. HENT:      Head: Normocephalic and atraumatic. Cardiovascular:      Rate and Rhythm: Tachycardia present.    Pulmonary:      Effort: Pulmonary effort is normal.   Abdominal: General: Abdomen is flat. Bowel sounds are decreased. Palpations: Abdomen is soft. Tenderness: There is no abdominal tenderness. Hernia: No hernia is present. Skin:     General: Skin is warm. Capillary Refill: Capillary refill takes 2 to 3 seconds. Findings: No erythema. Neurological:      General: No focal deficit present. Mental Status: He is alert. DIAGNOSTIC RESULTS     EKG: All EKG's are interpreted by the Emergency Department Physician who either signs or Co-signsthis chart in the absence of a cardiologist.  EKG shows sinus tachycardia rate of 103. No acute ST-T wave changes. Normal axis. Otherwise normal EKG. RADIOLOGY:   Non-plain filmimages such as CT, Ultrasound and MRI are read by the radiologist. Plain radiographic images are visualized and preliminarily interpreted by the emergency physician with the below findings:      Interpretation per the Radiologist below, if available at the time ofthis note:    CT ABDOMEN PELVIS W IV CONTRAST Additional Contrast? None   Final Result      Limited study. Small bowel small bowel obstruction. Transition occurring anteriorly and pelvis at level ileum. No masses visualized. Cystectomy with ileal conduit. Right hip replacement. All CT scans at this facility use dose modulation, iterative reconstruction, and/or weight based dosing when appropriate to reduce radiation dose to as low as reasonably achievable.                      ED BEDSIDE ULTRASOUND:   Performed by ED Physician - none    LABS:  Labs Reviewed   COMPREHENSIVE METABOLIC PANEL - Abnormal; Notable for the following components:       Result Value    Sodium 132 (*)     Chloride 89 (*)     Glucose 120 (*)     Calcium 11.8 (*)     Total Protein 8.1 (*)     Albumin 4.7 (*)     All other components within normal limits   CBC WITH AUTO DIFFERENTIAL - Abnormal; Notable for the following components:    WBC 12.9 (*)     Hemoglobin 13.0 (*) Hematocrit 40.3 (*)     MCV 72.5 (*)     MCH 23.3 (*)     MCHC 32.2 (*)     RDW 22.4 (*)     Neutrophils Absolute 11.1 (*)     Lymphocytes Absolute 0.5 (*)     Monocytes Absolute 1.4 (*)     All other components within normal limits   URINALYSIS WITH REFLEX TO CULTURE - Abnormal; Notable for the following components:    Clarity, UA CLOUDY (*)     Ketones, Urine TRACE (*)     Protein, UA 30 (*)     Nitrite, Urine POSITIVE (*)     Leukocyte Esterase, Urine SMALL (*)     All other components within normal limits   POCT CREATININE - Normal   MICROSCOPIC URINALYSIS       All other labs were within normal range or not returned as of this dictation. EMERGENCY DEPARTMENT COURSE and DIFFERENTIAL DIAGNOSIS/MDM:   Vitals:    Vitals:    08/31/22 1510 08/31/22 1530 08/31/22 1613 08/31/22 1630   BP: 119/76 108/76 (!) 104/58 128/72   Pulse: (!) 104 100 (!) 109 98   Resp: 18 13 20 17   Temp:       TempSrc:       SpO2: 99% 99% 98% 100%        MDM  Patient with sign of small bowel obstruction on CT. Labs within normal range. Patient will be transferred to [facility] AT [facility] as that is where his urostomy and other abdominal surgery occurred. CONSULTS:  None    PROCEDURES:  Unless otherwise noted below, none     Procedures    FINAL IMPRESSION      1. Small bowel obstruction [facility])          DISPOSITION/PLAN   DISPOSITION Decision To Transfer 08/31/2022 05:14:58 PM      PATIENT REFERRED TO:  No follow-up provider specified.     DISCHARGE MEDICATIONS:  New Prescriptions    No medications on file          (Please note that portions of this note were completed with a voice recognition program.  Efforts were made to edit the dictations but occasionally words are mis-transcribed.)    Dolores Magallanes MD (electronically signed)  Attending Emergency Physician          Dolores Magallanes MD  08/31/22 5711

## 2022-08-31 NOTE — ED NOTES
Report attempted to be called to [facility] main ED at [phone]. To call him back in 10 min.      Deep Kim RN  08/31/22 6187

## 2022-08-31 NOTE — ED NOTES
Returned. Placed back onto monitor. Call light within reach. States pain is still 10/10 at this time. States he usually needs Dilaudid as Morphine doesn't do much for him. Dr Adama Brock made aware.      Aspen Mckenna RN  08/31/22 5050

## 2022-08-31 NOTE — ED NOTES
Urostomy emptied. Lights turned off. Pt going to try to sleep.  Pain 9/10     Romayne Mom, RN  08/31/22 7166

## 2022-08-31 NOTE — TELEPHONE ENCOUNTER
Megan Licea called in this morning to explain that he is \"deathly ill\". He has been vomiting for the past 4 days, unable to keep down medications, fluids, or food. He has fallen \"4 times\", including this morning d/t being so weak. He reports that he is having issues with his RLE \"nerve pain\", 10/10. He went to [facility] yesterday, he reports they did an X-ray, gave him Toradol and dilaudid and sent him home. I asked if they gave him any fluids, he said no. He feels bloated and has gas pressure, last BM was yesterday- he reports it was \"very hard and hard to get out\"- prior to this he had a BM 2 days ago. He reports that he is scheduled to come to [facility] 9/1 for an MRI of his leg. If you are able to review chart/meds, and possibly call him? Or let me know what you want me to say to him and I will call him! [phone]. Thanks!

## 2022-08-31 NOTE — TELEPHONE ENCOUNTER
Mayra Khalil called again at 1 pm. He states that he continues to vomit large volumes of green fluid and is perplexed because he has not had much intake in fluid or food today. He states he has not yet tried the zofran ordered earlier today because he has not been able to get the medication due to transportation issues. His cousin, Domingo Shields was with him during this call. Domingo Alyson states that she is concerned with what appears to be large amounts of vomited bile and Sagar's inability to keep solid or liquids down. Mayra Khalil states the vomiting began 3 days ago. I told him his electrolytes could also become imbalanced and I advised he go to the ER for further evaluation. Domingo Shields states she will drive him within the next ten minutes.

## 2022-08-31 NOTE — ED NOTES
Call to [address] [facility] to [facility]. Spoke with Dion Bean RN. She will call  and call us back with Gen Surg on call.      Arti Batista  08/31/22 5824

## 2022-08-31 NOTE — ED NOTES
Report given to [facility] at [facility] main ED     Marleen Smith RN  08/31/22 1922

## 2022-08-31 NOTE — ED NOTES
Informed patient of NG placement/purpose/procedure. NG placed by this RN with Owen Ramírez RN helping. Position confirmed with air bolus and with large amount of bile coming out. Placed on intermittent suction. Pt yelling to \"GET IT OUT NOW! \" Tried to calm patient and give reassurance that the gagging feeling will subside. This RN still holding NG in place while Owen Ramírez was trying to secure it to his nose. Pt grabbed my hand with one hand and yanked the NG out with the other hand. Pt adamantly refused it to be reinserted. Dr Johan Donahue made aware.      Harlie Mortimer, RN  08/31/22 5088

## 2022-08-31 NOTE — TELEPHONE ENCOUNTER
I called Raffi Groves back at 8:50 and he states he is vomiting today and unable to keep food down. He said the staff at [facility] did not help him that much this morning when he went to the ER. He also notes that he has an MRI tomorrow and is extremely claustrophobic and wondered if I could order something for him. I advised him to call the ordering provider's office to make them aware and the provider can order a medication if indicated. I told Raffi Groves that I will order ondansetron 4 mg TID as needed. I told him to call back if this does not help the vomiting. He verbalizes understanding and promises to call if ondansetron does not help.

## 2022-09-01 LAB
EKG ATRIAL RATE: 103 BPM
EKG P AXIS: 77 DEGREES
EKG P-R INTERVAL: 146 MS
EKG Q-T INTERVAL: 342 MS
EKG QRS DURATION: 78 MS
EKG QTC CALCULATION (BAZETT): 448 MS
EKG R AXIS: 53 DEGREES
EKG T AXIS: 75 DEGREES
EKG VENTRICULAR RATE: 103 BPM

## 2022-09-02 RX ORDER — CALCIUM CARBONATE 160(400)MG
TABLET,CHEWABLE ORAL
Qty: 1 EACH | Refills: 0 | Status: SHIPPED | OUTPATIENT
Start: 2022-09-02

## 2022-09-03 ENCOUNTER — HOSPITAL ENCOUNTER (EMERGENCY)
Age: 62
Discharge: HOME | End: 2022-09-03
Payer: MEDICARE

## 2022-09-03 ENCOUNTER — APPOINTMENT (OUTPATIENT)
Dept: CT IMAGING | Age: 62
End: 2022-09-03
Payer: MEDICARE

## 2022-09-03 VITALS
WEIGHT: 124 LBS | RESPIRATION RATE: 18 BRPM | OXYGEN SATURATION: 98 % | DIASTOLIC BLOOD PRESSURE: 61 MMHG | TEMPERATURE: 98 F | HEART RATE: 87 BPM | SYSTOLIC BLOOD PRESSURE: 107 MMHG | BODY MASS INDEX: 18.85 KG/M2

## 2022-09-03 DIAGNOSIS — M54.41 ACUTE RIGHT-SIDED LOW BACK PAIN WITH RIGHT-SIDED SCIATICA: Primary | ICD-10-CM

## 2022-09-03 DIAGNOSIS — G89.29 ACUTE EXACERBATION OF CHRONIC LOW BACK PAIN: ICD-10-CM

## 2022-09-03 DIAGNOSIS — M54.50 ACUTE EXACERBATION OF CHRONIC LOW BACK PAIN: ICD-10-CM

## 2022-09-03 DIAGNOSIS — N39.0 ACUTE UTI: ICD-10-CM

## 2022-09-03 DIAGNOSIS — G89.4 CHRONIC PAIN SYNDROME: ICD-10-CM

## 2022-09-03 DIAGNOSIS — C67.9 BLADDER CARCINOMA (HCC): ICD-10-CM

## 2022-09-03 LAB
BACTERIA: NEGATIVE /HPF
BILIRUBIN URINE: NEGATIVE
BLOOD, URINE: NEGATIVE
CLARITY: CLEAR
COLOR: YELLOW
EPITHELIAL CELLS, UA: ABNORMAL /HPF (ref 0–5)
GLUCOSE URINE: NEGATIVE MG/DL
HYALINE CASTS: ABNORMAL /HPF (ref 0–5)
KETONES, URINE: NEGATIVE MG/DL
LEUKOCYTE ESTERASE, URINE: ABNORMAL
NITRITE, URINE: POSITIVE
PH UA: 7 (ref 5–9)
PROTEIN UA: NEGATIVE MG/DL
RBC UA: ABNORMAL /HPF (ref 0–5)
SPECIFIC GRAVITY UA: 1.01 (ref 1–1.03)
URINE REFLEX TO CULTURE: ABNORMAL
UROBILINOGEN, URINE: 0.2 E.U./DL
WBC UA: ABNORMAL /HPF (ref 0–5)

## 2022-09-03 PROCEDURE — 96372 THER/PROPH/DIAG INJ SC/IM: CPT

## 2022-09-03 PROCEDURE — 72131 CT LUMBAR SPINE W/O DYE: CPT

## 2022-09-03 PROCEDURE — 6360000002 HC RX W HCPCS: Performed by: PHYSICIAN ASSISTANT

## 2022-09-03 PROCEDURE — 81001 URINALYSIS AUTO W/SCOPE: CPT

## 2022-09-03 PROCEDURE — 6370000000 HC RX 637 (ALT 250 FOR IP): Performed by: PHYSICIAN ASSISTANT

## 2022-09-03 PROCEDURE — 99284 EMERGENCY DEPT VISIT MOD MDM: CPT

## 2022-09-03 RX ORDER — TIZANIDINE 4 MG/1
4 TABLET ORAL EVERY 6 HOURS PRN
Qty: 21 TABLET | Refills: 0 | Status: SHIPPED | OUTPATIENT
Start: 2022-09-03

## 2022-09-03 RX ORDER — DIAZEPAM 5 MG/1
5 TABLET ORAL ONCE
Status: COMPLETED | OUTPATIENT
Start: 2022-09-03 | End: 2022-09-03

## 2022-09-03 RX ORDER — NITROFURANTOIN 25; 75 MG/1; MG/1
100 CAPSULE ORAL ONCE
Status: COMPLETED | OUTPATIENT
Start: 2022-09-03 | End: 2022-09-03

## 2022-09-03 RX ORDER — KETOROLAC TROMETHAMINE 30 MG/ML
30 INJECTION, SOLUTION INTRAMUSCULAR; INTRAVENOUS ONCE
Status: COMPLETED | OUTPATIENT
Start: 2022-09-03 | End: 2022-09-03

## 2022-09-03 RX ORDER — PREDNISONE 20 MG/1
60 TABLET ORAL ONCE
Status: COMPLETED | OUTPATIENT
Start: 2022-09-03 | End: 2022-09-03

## 2022-09-03 RX ORDER — LIDOCAINE 50 MG/G
1 PATCH TOPICAL DAILY
Qty: 30 PATCH | Refills: 0 | Status: SHIPPED | OUTPATIENT
Start: 2022-09-03

## 2022-09-03 RX ORDER — NAPROXEN 500 MG/1
500 TABLET ORAL 2 TIMES DAILY WITH MEALS
Qty: 30 TABLET | Refills: 0 | Status: SHIPPED | OUTPATIENT
Start: 2022-09-03

## 2022-09-03 RX ORDER — NITROFURANTOIN 25; 75 MG/1; MG/1
100 CAPSULE ORAL 2 TIMES DAILY
Qty: 20 CAPSULE | Refills: 0 | Status: SHIPPED | OUTPATIENT
Start: 2022-09-03 | End: 2022-09-13

## 2022-09-03 RX ORDER — PREDNISONE 20 MG/1
40 TABLET ORAL DAILY
Qty: 8 TABLET | Refills: 0 | Status: SHIPPED | OUTPATIENT
Start: 2022-09-03 | End: 2022-09-07

## 2022-09-03 RX ADMIN — DIAZEPAM 5 MG: 5 TABLET ORAL at 11:12

## 2022-09-03 RX ADMIN — KETOROLAC TROMETHAMINE 30 MG: 30 INJECTION, SOLUTION INTRAMUSCULAR; INTRAVENOUS at 11:14

## 2022-09-03 RX ADMIN — PREDNISONE 60 MG: 20 TABLET ORAL at 11:09

## 2022-09-03 RX ADMIN — NITROFURANTOIN MONOHYDRATE/MACROCRYSTALLINE 100 MG: 25; 75 CAPSULE ORAL at 12:19

## 2022-09-03 ASSESSMENT — LIFESTYLE VARIABLES
HOW OFTEN DO YOU HAVE A DRINK CONTAINING ALCOHOL: NEVER
HOW MANY STANDARD DRINKS CONTAINING ALCOHOL DO YOU HAVE ON A TYPICAL DAY: PATIENT DOES NOT DRINK

## 2022-09-03 ASSESSMENT — PAIN DESCRIPTION - DESCRIPTORS: DESCRIPTORS: ACHING

## 2022-09-03 ASSESSMENT — PAIN - FUNCTIONAL ASSESSMENT: PAIN_FUNCTIONAL_ASSESSMENT: 0-10

## 2022-09-03 ASSESSMENT — PAIN DESCRIPTION - PAIN TYPE: TYPE: CHRONIC PAIN

## 2022-09-03 ASSESSMENT — PAIN SCALES - GENERAL: PAINLEVEL_OUTOF10: 10

## 2022-09-03 ASSESSMENT — PAIN DESCRIPTION - LOCATION: LOCATION: BACK

## 2022-09-03 NOTE — ED PROVIDER NOTES
HPI:        Rex Villa is a 65y.o. year old male with PMHx of bladder CA, lung CA, chronic back pain who presents with the chief complaint of back pain starting  several months ago . The pain is located in lumbar region and right lateral side described as aching, throbbing, stabbing, tender, and miserable, and rated as severe, with radiation into the thigh. The patient states that the pain started after a surgery months ago and hasn't improved. Symptoms include weakness of the right leg, and leg pain . The patient denies saddle anesthesia, bowel/bladder incontinence, IVDU, prolonged steroid usage,  significant trauma or recent illness. No genitourinary symptoms reported. PAST MEDICAL HISTORY     Past Medical History:   Diagnosis Date    Arthritis     Asthma     Bipolar disorder ([facility]     CAD (coronary artery disease)     Cancer ([facility]     R lung cancer, bladder cancer 6 times     Cerebral artery occlusion with cerebral infarction ([facility]     COPD (chronic obstructive pulmonary disease) ([facility]     Depression     Essential hypertension 03/23/2019    Hep C w/o coma, chronic ([address]     took medication for 90 days    Hepatitis C     Hyperlipidemia     Kidney stone 2015    Melanoma ([address] 2011    L eye was removed     Melanoma ([facility] 2011    L eye was removed      SURGICALHISTORY       Past Surgical History:   Procedure Laterality Date    CORONARY ANGIOPLASTY WITH STENT PLACEMENT Left     stents placed to L carotid (2015) L arm (6/2017)    EYE SURGERY      melanoma took L eye    [address] INCL FLUOR GDNCE DX W/CELL WASHG SPX N/A 7/13/2018    BRONCHOSCOPY performed by Tiffanie Navarrete MD at [place] [address]. Left 7/28/2022    LEFT THORACOSCOPIC LOWER LOBE WEDGE performed by Abdirizak Mobley MD at [address] [place] [address]            Patient has no known allergies. FAMILY HISTORY     History reviewed. No pertinent family history.    SOCIAL HISTORY       Social History     Socioeconomic History    Marital status: abdominal tenderness. There is no right CVA tenderness or left CVA tenderness. Musculoskeletal:         General: Tenderness present. No swelling, deformity or signs of injury. Cervical back: Normal, normal range of motion and neck supple. No tenderness. Thoracic back: Normal.      Lumbar back: Tenderness present. Positive right straight leg raise test and positive left straight leg raise test.        Back:    Skin:     General: Skin is warm and dry. Capillary Refill: Capillary refill takes less than 2 seconds. Findings: No rash. Neurological:      Mental Status: He is alert and oriented to person, place, and time. Deep Tendon Reflexes: Babinski sign absent on the right side. Babinski sign absent on the left side. Reflex Scores:       Patellar reflexes are 2+ on the right side and 2+ on the left side. Achilles reflexes are 2+ on the right side and 2+ on the left side. Psychiatric:         Mood and Affect: Mood normal.         Behavior: Behavior normal.        Impression:     Labs Reviewed   URINALYSIS WITH REFLEX TO CULTURE - Abnormal; Notable for the following components:       Result Value    Nitrite, Urine POSITIVE (*)     Leukocyte Esterase, Urine TRACE (*)     All other components within normal limits   MICROSCOPIC URINALYSIS - Abnormal; Notable for the following components:    WBC, UA 6-9 (*)     RBC, UA 3-5 (*)     All other components within normal limits        CT LUMBAR SPINE WO CONTRAST   Final Result      No fracture. No malalignment. All CT scans at this facility use dose modulation, iterative reconstruction, and/or weight based dosing when appropriate to reduce radiation dose to as low as reasonably achievable. REASSESSMENT          PROCEDURES:  Unless otherwise noted below, none     Procedures    FINAL IMPRESSION      1. Acute right-sided low back pain with right-sided sciatica    2. Chronic pain syndrome    3.  Bladder carcinoma ([facility] 4. Acute exacerbation of chronic low back pain    5. Acute UTI          I considered the differential diagnoses for back pain including discitis, osteomyelitis, epidural abscess, abdominal aortic aneurysm and dissection, ureterolithiasis, sciatica, muscle spasm, and pyelonephritis. History and physical exam most c/w acute exacerbation of chronic low back pain, and acute UTI and without any concerning red flag signs or symptoms. The patient ambulated out of the emergency department with a steady gait with improvement in his symptoms.         Plan:        DISPOSITION Decision To Discharge 09/03/2022 12:28:14 PM      PATIENT REFERREDTO:  Carmelo Hills MD  [address] [place] [address]  [phone]    Schedule an appointment as soon as possible for a visit   As needed    Krzysztof Moore MD  [address]/ [place] [address], [address] [place] [address]  [phone]          DISCHARGEMEDICATIONS:  Discharge Medication List as of 9/3/2022 12:14 PM        START taking these medications    Details   tiZANidine (ZANAFLEX) 4 MG tablet Take 1 tablet by mouth every 6 hours as needed (spasms), Disp-21 tablet, R-0Normal      naproxen (NAPROSYN) 500 MG tablet Take 1 tablet by mouth 2 times daily (with meals), Disp-30 tablet, R-0Normal      predniSONE (DELTASONE) 20 MG tablet Take 2 tablets by mouth daily for 4 days, Disp-8 tablet, R-0Normal      lidocaine (LIDODERM) 5 % Place 1 patch onto the skin daily 12 hours on, 12 hours off., Disp-30 patch, R-0Normal      diclofenac sodium (VOLTAREN) 1 % GEL Apply 4 g topically 4 times daily, Topical, 4 TIMES DAILY Starting Sat 9/3/2022, Disp-100 g, R-0, Normal      nitrofurantoin, macrocrystal-monohydrate, (MACROBID) 100 MG capsule Take 1 capsule by mouth 2 times daily for 10 days, Disp-20 capsule, R-0Normal                (Please note that portions of this note were completed with a voice recognition program.  Efforts were made to edit the dictations but occasionally words are mis-transcribed.)    Lizy John PA-C (electronically signed)  Attending Emergency Physician       Lizy John PA-C         The patient and/or family as well as anybody present:     -if seated in an open space, such as Results Waiting/Lobby, Fast Track area or similar, they were asked permission and permission granted if we could proceed with medical questioning and discussion of medical test results     -had the results of all tests and the diagnosis reviewed and explained to them and there were no further questions      -patient expressed understanding and was agreeable to the stated plan.  No barriers of communication were apparent and all questions were answered.     -were given both verbal and written discharge instructions     -were instructed of the importance of close follow-up     -were told that close follow-up is essential for good health and good outcomes      -were given a work/school excuse, if needed     -were told that we would call them with final positive culture/lab results        Lizy John PA-C  09/03/22 [employer] TIGIST Hinkle  10/31/22 [id number]

## 2022-09-03 NOTE — ED TRIAGE NOTES
Patient states that he has chronic back pain. States that he was scheduled to have an MRI yesterday but missed his appointment due to being in the hospital.  Patient states that he has been \"doubling up his percocet\" which caused a partial small bowel obstruction and he was admitted to [facility]. Patient on Percocet and morphine for pain. Patient states that he needs his MRI.

## 2022-09-07 RX ORDER — OMEPRAZOLE 20 MG/1
20 CAPSULE, DELAYED RELEASE ORAL
Qty: 30 CAPSULE | Refills: 5 | Status: SHIPPED | OUTPATIENT
Start: 2022-09-07

## 2022-09-07 NOTE — PROGRESS NOTES
Magan Juárez called stating he is having acid reflux. He has had it on and off for years and states Prilosec has helped him in the past. Order placed for Prilosec.

## 2022-09-08 ENCOUNTER — TELEPHONE (OUTPATIENT)
Dept: CARDIOTHORACIC SURGERY | Age: 62
End: 2022-09-08

## 2022-09-08 ENCOUNTER — TELEPHONE (OUTPATIENT)
Dept: RADIATION ONCOLOGY | Age: 62
End: 2022-09-08

## 2022-09-08 NOTE — TELEPHONE ENCOUNTER
Patients LOGAN Mccoy called requesting a call back from you. She wants to go over [facility] surgical pathology from the surgery he had with you. She says that he is in the hospital now at [facility] with cancer that is spreading and has some questions.

## 2022-09-09 ENCOUNTER — TELEPHONE (OUTPATIENT)
Dept: RADIATION ONCOLOGY | Age: 62
End: 2022-09-09

## 2022-09-09 NOTE — TELEPHONE ENCOUNTER
Jeremías Locke called to inform that he is now at home sitting in his recliner. He said that he feels \"settled\" and that he knew his need for hospice was \"just a matter of time\" and he plans to take things one day at a time. I told him that Hemanth Gonzalez RN is working to get hospice set up. Jeremías Locke said that he will call Darrenkenn Escobar to let her know that he is at home resting.

## 2022-09-09 NOTE — TELEPHONE ENCOUNTER
Prince valentine called to inform that he has decided to go on hospice. I told him that I am very sorry. He said that he has fought this disease for 17 years and he is ready for hospice. 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 Prince valentine said he remains at [facility] and he does not feel his pain is being well controlled. He aims to be discharged around 9:30-10 am and plans to be admitted to hospice care through [facility] ([facility] He said he will call back later to update.

## 2022-09-11 PROBLEM — C67.9 BLADDER CANCER (HCC): Status: ACTIVE | Noted: 2022-09-11

## 2022-10-31 ASSESSMENT — ENCOUNTER SYMPTOMS
COUGH: 0
ABDOMINAL PAIN: 0
SHORTNESS OF BREATH: 0
NAUSEA: 0
DIARRHEA: 0
VOMITING: 0
BACK PAIN: 1
CONSTIPATION: 1

## 2023-09-07 VITALS
SYSTOLIC BLOOD PRESSURE: 121 MMHG | BODY MASS INDEX: 18.91 KG/M2 | WEIGHT: 127.65 LBS | DIASTOLIC BLOOD PRESSURE: 64 MMHG | TEMPERATURE: 97.5 F | OXYGEN SATURATION: 97 % | HEART RATE: 97 BPM | HEIGHT: 69 IN | RESPIRATION RATE: 16 BRPM

## 2023-09-07 VITALS — HEIGHT: 69 IN | WEIGHT: 122.8 LBS | BODY MASS INDEX: 18.19 KG/M2

## (undated) DEVICE — PAD,NON-ADHERENT,3X8,STERILE,LF,1/PK: Brand: MEDLINE

## (undated) DEVICE — TROCAR ENDOSCP L12MM BLK NONCONDUCTIVE SL SFT REP DISP

## (undated) DEVICE — HYPODERMIC SAFETY NEEDLE: Brand: MAGELLAN

## (undated) DEVICE — CONNECTOR TBNG WHT PLAS SUCT STR 5IN1 LTWT W/ M CONN

## (undated) DEVICE — WARMER SCP 2 ANTIFOG LAP DISP

## (undated) DEVICE — SUTURE VCRL SZ 3-0 L27IN ABSRB UD L26MM SH 1/2 CIR J416H

## (undated) DEVICE — GOWN,SIRUS,NONRNF,SETINSLV,2XL,18/CS: Brand: MEDLINE

## (undated) DEVICE — SYRINGE IRRIG 60ML SFT PLIABLE BLB EZ TO GRP 1 HND USE W/

## (undated) DEVICE — TUBING, SUCTION, 1/4" X 10', STRAIGHT: Brand: MEDLINE

## (undated) DEVICE — LABEL MED MINI W/ MARKER

## (undated) DEVICE — 4-PORT MANIFOLD: Brand: NEPTUNE 2

## (undated) DEVICE — UNIT DRNGE SGL COLL W/ INLINE CONN EXPR

## (undated) DEVICE — COUNTER NDL 40 COUNT HLD 70 FOAM BLK ADH W/ MAG

## (undated) DEVICE — PACK,BASIC: Brand: MEDLINE

## (undated) DEVICE — DRESSING COMP W4XL4IN N ADH PD W2.5XL2.5IN GZ BORDERED ADH

## (undated) DEVICE — TAPE,ELASTIC,FOAM,CURAD,3"X5.5YD,LF: Brand: CURAD

## (undated) DEVICE — GAUZE,SPONGE,4"X4",16PLY,XRAY,STRL,LF: Brand: MEDLINE

## (undated) DEVICE — FLEXIBLE ENDOSCOPE: Brand: ASCOPE™ 4 BRONCHO REGULAR 5.0/2.2

## (undated) DEVICE — BLADE ES ELASTOMERIC COAT INSUL DURABLE BEND UPTO 90DEG

## (undated) DEVICE — SPONGE,LAP,18"X18",DLX,XR,ST,5/PK,40/PK: Brand: MEDLINE

## (undated) DEVICE — NEEDLE HYPO 18GA L1.5IN THN WALL PIVOTING SHLD BVL ORIENTED

## (undated) DEVICE — GOWN,AURORA,NONREINFORCED,LARGE: Brand: MEDLINE

## (undated) DEVICE — TOWEL,OR,DSP,ST,BLUE,STD,4/PK,20PK/CS: Brand: MEDLINE

## (undated) DEVICE — NEPTUNE E-SEP SMOKE EVACUATION PENCIL, COATED, 70MM BLADE, PUSH BUTTON SWITCH: Brand: NEPTUNE E-SEP

## (undated) DEVICE — STAPLER INT L34CM 60MM LNG ENDOSCP ARTC PWR + ECHELON FLX

## (undated) DEVICE — DRAPE,LAP,CHOLE,W/TROUGHS,STERILE: Brand: MEDLINE

## (undated) DEVICE — GLOVE SURG SZ 75 STD WHT LTX SYN POLYMER BEAD REINF ANTI RL

## (undated) DEVICE — COVER LT HNDL BLU PLAS

## (undated) DEVICE — MAGNETIC INSTR DRAPE 20X16: Brand: MEDLINE INDUSTRIES, INC.

## (undated) DEVICE — SUTURE PERMAHAND SZ 0 L30IN NONABSORBABLE BLK FSL L30MM 3/8 680H

## (undated) DEVICE — KIT,ANTI FOG,W/SPONGE & FLUID,SOFT PACK: Brand: MEDLINE

## (undated) DEVICE — GLOVE ORANGE PI 8   MSG9080

## (undated) DEVICE — CATHETER THOR 20FR L22IN PVC 4 EYELET STR ATRAUM

## (undated) DEVICE — APPLICATOR MEDICATED 26 CC SOLUTION HI LT ORNG CHLORAPREP

## (undated) DEVICE — SYRINGE MED 10ML LUERLOCK TIP W/O SFTY DISP

## (undated) DEVICE — BLADE ES L6IN ELASTOMERIC COAT INSUL DURABLE BEND UPTO

## (undated) DEVICE — CONTAINER,SPECIMEN,OR STERILE,4OZ: Brand: MEDLINE

## (undated) DEVICE — SUTURE VCRL SZ 4-0 L18IN ABSRB UD L19MM PS-2 3/8 CIR PRIM J496H

## (undated) DEVICE — ECHELON 60MM REINFORCEMENT: Brand: ECHLEON

## (undated) DEVICE — DRESSING GZ W1XL8IN COT XRFRM N ADH OVERWRAP CURAD

## (undated) DEVICE — RELOAD STPL L60MM H1.5-3.6MM REG TISS BLU GRIPPING SURF B

## (undated) DEVICE — AIRLIFE™ ADULT AEROSOL MASK VINYL, UNDER-THE-CHIN STYLE: Brand: AIRLIFE™

## (undated) DEVICE — BAG RETRIEVAL SPECIMEN SUPERBAG 10 MEDIUM NYLON ITRODUCER

## (undated) DEVICE — Device

## (undated) DEVICE — 6 ML SYRINGE LUER-LOCK TIP: Brand: MONOJECT

## (undated) DEVICE — INTENDED FOR TISSUE SEPARATION, AND OTHER PROCEDURES THAT REQUIRE A SHARP SURGICAL BLADE TO PUNCTURE OR CUT.: Brand: BARD-PARKER ® CARBON RIB-BACK BLADES

## (undated) DEVICE — ELECTRODE PT RET AD L9FT HI MOIST COND ADH HYDRGEL CORDED